# Patient Record
Sex: MALE | Race: WHITE | NOT HISPANIC OR LATINO | Employment: UNEMPLOYED | URBAN - METROPOLITAN AREA
[De-identification: names, ages, dates, MRNs, and addresses within clinical notes are randomized per-mention and may not be internally consistent; named-entity substitution may affect disease eponyms.]

---

## 2019-04-08 PROBLEM — E03.9 HYPOTHYROIDISM: Status: ACTIVE | Noted: 2019-04-08

## 2019-04-17 PROBLEM — J98.9 DISEASE OF AIRWAY: Status: ACTIVE | Noted: 2019-02-06

## 2019-10-30 PROBLEM — E07.9 THYROID DISEASE: Status: ACTIVE | Noted: 2019-04-08

## 2019-10-30 PROBLEM — E78.5 HYPERLIPIDEMIA: Status: ACTIVE | Noted: 2019-10-30

## 2021-05-12 ENCOUNTER — PATIENT MESSAGE (OUTPATIENT)
Dept: RESEARCH | Facility: HOSPITAL | Age: 46
End: 2021-05-12

## 2021-05-14 PROBLEM — G47.33 SEVERE OBSTRUCTIVE SLEEP APNEA: Status: ACTIVE | Noted: 2021-05-14

## 2021-05-19 ENCOUNTER — HISTORICAL (OUTPATIENT)
Dept: ADMINISTRATIVE | Facility: HOSPITAL | Age: 46
End: 2021-05-19

## 2021-12-17 ENCOUNTER — HISTORICAL (OUTPATIENT)
Dept: ADMINISTRATIVE | Facility: HOSPITAL | Age: 46
End: 2021-12-17

## 2023-01-07 ENCOUNTER — HISTORICAL (OUTPATIENT)
Dept: ADMINISTRATIVE | Facility: HOSPITAL | Age: 48
End: 2023-01-07

## 2023-01-26 DIAGNOSIS — R25.1 TREMOR: ICD-10-CM

## 2023-01-26 DIAGNOSIS — R41.3 MEMORY LOSS: Primary | ICD-10-CM

## 2023-01-26 DIAGNOSIS — R26.9 GAIT ABNORMALITY: ICD-10-CM

## 2023-08-30 ENCOUNTER — OFFICE VISIT (OUTPATIENT)
Dept: NEUROLOGY | Facility: CLINIC | Age: 48
End: 2023-08-30
Payer: COMMERCIAL

## 2023-08-30 VITALS
BODY MASS INDEX: 52.48 KG/M2 | WEIGHT: 315 LBS | SYSTOLIC BLOOD PRESSURE: 128 MMHG | HEIGHT: 65 IN | DIASTOLIC BLOOD PRESSURE: 64 MMHG

## 2023-08-30 DIAGNOSIS — R25.1 TREMOR: ICD-10-CM

## 2023-08-30 DIAGNOSIS — F32.A DEPRESSION, UNSPECIFIED DEPRESSION TYPE: ICD-10-CM

## 2023-08-30 DIAGNOSIS — R26.9 GAIT ABNORMALITY: ICD-10-CM

## 2023-08-30 DIAGNOSIS — R41.3 MEMORY LOSS: ICD-10-CM

## 2023-08-30 PROCEDURE — 99999 PR PBB SHADOW E&M-EST. PATIENT-LVL III: CPT | Mod: PBBFAC,,, | Performed by: SPECIALIST

## 2023-08-30 PROCEDURE — 99205 OFFICE O/P NEW HI 60 MIN: CPT | Mod: S$GLB,,, | Performed by: SPECIALIST

## 2023-08-30 PROCEDURE — 99205 PR OFFICE/OUTPT VISIT, NEW, LEVL V, 60-74 MIN: ICD-10-PCS | Mod: S$GLB,,, | Performed by: SPECIALIST

## 2023-08-30 PROCEDURE — 99999 PR PBB SHADOW E&M-EST. PATIENT-LVL III: ICD-10-PCS | Mod: PBBFAC,,, | Performed by: SPECIALIST

## 2023-08-30 RX ORDER — BUSPIRONE HYDROCHLORIDE 10 MG/1
10 TABLET ORAL DAILY
COMMUNITY
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
## 2023-09-22 ENCOUNTER — OFFICE VISIT (OUTPATIENT)
Dept: NEUROLOGY | Facility: CLINIC | Age: 48
End: 2023-09-22
Payer: COMMERCIAL

## 2023-09-22 DIAGNOSIS — F32.A DEPRESSION, UNSPECIFIED DEPRESSION TYPE: ICD-10-CM

## 2023-09-22 DIAGNOSIS — F09 MILD COGNITIVE DISORDER: Primary | ICD-10-CM

## 2023-09-22 PROCEDURE — 99213 PR OFFICE/OUTPT VISIT, EST, LEVL III, 20-29 MIN: ICD-10-PCS | Mod: 95,,, | Performed by: SPECIALIST

## 2023-09-22 PROCEDURE — 99213 OFFICE O/P EST LOW 20 MIN: CPT | Mod: 95,,, | Performed by: SPECIALIST

## 2023-09-22 NOTE — PROGRESS NOTES
This is a telemedicine note.   Patient was treated using telemedicine, real time audio and video, according to [facility] protocols.   I, Nick Arnold MD, conducted the visit from the Neurology clinic of [facility].   The patient participated in the visit at a non-[facility] location selected by the patient, identified below.   I am licensed in the state where the patient stated they are located.   The patient stated that they understood and accepted the privacy and security risks to their information at their location.   This visit is not recorded.    Patient was located at the patient's home.     Elijah Rivera is a 48 y.o. male seen today via telemedicine visit.       Subjective:         Patient ID: Elijah Rivera is a 48 y.o. male.    Chief Complaint: fu cog impairm    HPI:           No chief complaint on file.  Many years of cogn impairment worsening over time per wife     notes may also be on facesheet for HPI, ROS, and other sections     Review of Systems          Social History     Socioeconomic History    Marital status:    Tobacco Use    Smoking status: Former     Current packs/day: 0.00     Types: Cigarettes     Quit date: 10/11/1999     Years since quittin.9    Smokeless tobacco: Never   Substance and Sexual Activity    Alcohol use: Not Currently    Drug use: Never     __Lives alone  Lives with ___  __Drives   __Does not drive   __Working   Retired:     Current Outpatient Medications   Medication Instructions    ADVAIR DISKUS 500-50 mcg/dose DsDv diskus inhaler inhale one PUFF into THE lungs TWICE DAILY    albuterol (PROVENTIL/VENTOLIN HFA) 90 mcg/actuation inhaler inhale 2 puffs by mouth every 4 to 6 hours as needed    blood sugar diagnostic Strp 1 strip, Misc.(Non-Drug; Combo Route), 2 times daily    busPIRone (BUSPAR) 10 mg, Oral, Daily    lancets (ONETOUCH DELICA LANCETS) 33 gauge Misc 1 lancet , Misc.(Non-Drug; Combo Route), 2 times daily    levothyroxine (SYNTHROID) 200 MCG  tablet TAKE ONE TABLET BY MOUTH EVERY DAY BEFORE BREAKFAST    lisinopriL-hydrochlorothiazide (PRINZIDE,ZESTORETIC) 10-12.5 mg per tablet 1 tablet, Oral, Daily    metFORMIN (GLUCOPHAGE) 1000 MG tablet take 1 tablet by mouth 2 times daily with meals    montelukast (SINGULAIR) 10 mg, Oral    ONETOUCH DELICA PLUS LANCET 30 gauge Misc 2 times daily    primidone (MYSOLINE) 50 mg, Oral, 2 times daily    rosuvastatin (CRESTOR) 10 mg, Oral, Nightly    TRINTELLIX 20 mg, Oral, Daily        Objective:      Exam Limited due to telemedicine restrictions.  There were no vitals taken for this visit.    General:   if accompanied, by:_ wife in drivers seat in parked car and some unkn family member in back seat     Neurological  Speech: he let wife talk   EOMs:  coord:   Gait:   No no head tremor intermittently present     Neuroimaging:  Images and imaging reports reviewed.  My comments: head CT dec looked ok     Labs:    meds:          Assessment/Plan:       Problem List Items Addressed This Visit          Neuro    Mild cognitive disorder - Primary       Psychiatric    Depression       Other comments/ follow up:      Hopeful reassurance attempted that we were not dealing with a neurodegenerative process such as Alzheimer's disease.    Apologies that I could not give a specific label a diagnosis.    Apologies that I had no pharmacological or other treatment recommendations at present     Suggested that perhaps other offices might be of greater help to them  Recommended that he ask his primary care office if they have suggestions for him to seek attention elsewhere    Video Time Documentation:  Spent 8 minutes with patient over video discussing health concerns.     Total time this visit:    15  min    Nick Arnold MD [facility]

## 2023-11-29 ENCOUNTER — HOSPITAL ENCOUNTER (OUTPATIENT)
Dept: RADIOLOGY | Facility: HOSPITAL | Age: 48
Discharge: HOME | End: 2023-11-29
Attending: NURSE PRACTITIONER
Payer: COMMERCIAL

## 2023-11-29 PROCEDURE — 76536 US EXAM OF HEAD AND NECK: CPT | Mod: TC

## 2024-08-09 ENCOUNTER — HOSPITAL ENCOUNTER (OUTPATIENT)
Dept: RADIOLOGY | Facility: HOSPITAL | Age: 49
Discharge: HOME | End: 2024-08-09
Attending: NURSE PRACTITIONER
Payer: COMMERCIAL

## 2024-08-09 PROCEDURE — 73630 X-RAY EXAM OF FOOT: CPT | Mod: TC,LT

## 2024-10-01 ENCOUNTER — HOSPITAL ENCOUNTER (EMERGENCY)
Facility: HOSPITAL | Age: 49
Discharge: HOME | End: 2024-10-01
Attending: FAMILY MEDICINE
Payer: COMMERCIAL

## 2024-10-01 VITALS
SYSTOLIC BLOOD PRESSURE: 116 MMHG | WEIGHT: 315 LBS | RESPIRATION RATE: 20 BRPM | OXYGEN SATURATION: 99 % | TEMPERATURE: 98 F | HEART RATE: 91 BPM | BODY MASS INDEX: 52.48 KG/M2 | DIASTOLIC BLOOD PRESSURE: 75 MMHG | HEIGHT: 65 IN

## 2024-10-01 DIAGNOSIS — R07.9 CHEST PAIN: Primary | ICD-10-CM

## 2024-10-01 LAB
ALBUMIN SERPL-MCNC: 4.7 G/DL (ref 3.4–5)
ALBUMIN/GLOB SERPL: 1.6 RATIO
ALP SERPL-CCNC: 89 UNIT/L (ref 50–144)
ALT SERPL-CCNC: 44 UNIT/L (ref 1–45)
ANION GAP SERPL CALC-SCNC: 12 MEQ/L (ref 2–13)
AST SERPL-CCNC: 35 UNIT/L (ref 17–59)
BACTERIA #/AREA URNS AUTO: ABNORMAL /HPF
BASOPHILS # BLD AUTO: 0.04 X10(3)/MCL (ref 0.01–0.08)
BASOPHILS NFR BLD AUTO: 0.5 % (ref 0.1–1.2)
BILIRUB SERPL-MCNC: 0.8 MG/DL (ref 0–1)
BILIRUB UR QL STRIP.AUTO: NEGATIVE
BNP BLD-MCNC: <20 PG/ML (ref 0–124.9)
BUN SERPL-MCNC: 17 MG/DL (ref 7–20)
CALCIUM SERPL-MCNC: 10.1 MG/DL (ref 8.4–10.2)
CHLORIDE SERPL-SCNC: 101 MMOL/L (ref 98–110)
CK MB SERPL-MCNC: 0.84 NG/ML (ref 0–3.38)
CK SERPL-CCNC: 107 U/L (ref 55–170)
CLARITY UR: CLEAR
CO2 SERPL-SCNC: 29 MMOL/L (ref 21–32)
COLOR UR AUTO: YELLOW
CREAT SERPL-MCNC: 1.19 MG/DL (ref 0.66–1.25)
CREAT/UREA NIT SERPL: 14 (ref 12–20)
D DIMER PPP IA.FEU-MCNC: 0.24 MG/L (ref 0.19–0.5)
EOSINOPHIL # BLD AUTO: 0.14 X10(3)/MCL (ref 0.04–0.54)
EOSINOPHIL NFR BLD AUTO: 1.8 % (ref 0.7–7)
ERYTHROCYTE [DISTWIDTH] IN BLOOD BY AUTOMATED COUNT: 15.9 %
GFR SERPLBLD CREATININE-BSD FMLA CKD-EPI: 74 ML/MIN/1.73/M2
GLOBULIN SER-MCNC: 3 GM/DL (ref 2–3.9)
GLUCOSE SERPL-MCNC: 151 MG/DL (ref 70–115)
GLUCOSE UR QL STRIP: >=1000
HCT VFR BLD AUTO: 48.8 % (ref 36–52)
HGB BLD-MCNC: 16 G/DL (ref 13–18)
HGB UR QL STRIP: ABNORMAL
IMM GRANULOCYTES # BLD AUTO: 0.02 X10(3)/MCL (ref 0–0.03)
IMM GRANULOCYTES NFR BLD AUTO: 0.3 % (ref 0–0.5)
KETONES UR QL STRIP: NEGATIVE
LEUKOCYTE ESTERASE UR QL STRIP: NEGATIVE
LYMPHOCYTES # BLD AUTO: 1.54 X10(3)/MCL (ref 1.32–3.57)
LYMPHOCYTES NFR BLD AUTO: 19.4 % (ref 20–55)
MCH RBC QN AUTO: 25.3 PG (ref 27–34)
MCHC RBC AUTO-ENTMCNC: 32.8 G/DL (ref 31–37)
MCV RBC AUTO: 77.1 FL (ref 79–99)
MONOCYTES # BLD AUTO: 0.76 X10(3)/MCL (ref 0.3–0.82)
MONOCYTES NFR BLD AUTO: 9.6 % (ref 4.7–12.5)
MUCOUS THREADS URNS QL MICRO: ABNORMAL /LPF
NEUTROPHILS # BLD AUTO: 5.43 X10(3)/MCL (ref 1.78–5.38)
NEUTROPHILS NFR BLD AUTO: 68.4 % (ref 37–73)
NITRITE UR QL STRIP: NEGATIVE
NRBC BLD AUTO-RTO: 0 %
PH UR STRIP: 6 [PH]
PLATELET # BLD AUTO: 203 X10(3)/MCL (ref 140–371)
PMV BLD AUTO: 9.2 FL (ref 9.4–12.4)
POTASSIUM SERPL-SCNC: 3.5 MMOL/L (ref 3.5–5.1)
PROT SERPL-MCNC: 7.7 GM/DL (ref 6.3–8.2)
PROT UR QL STRIP: NEGATIVE
RBC # BLD AUTO: 6.33 X10(6)/MCL (ref 4–6)
RBC #/AREA URNS AUTO: ABNORMAL /HPF
SARS-COV-2 RDRP RESP QL NAA+PROBE: NEGATIVE
SODIUM SERPL-SCNC: 142 MMOL/L (ref 136–145)
SP GR UR STRIP.AUTO: 1.01 (ref 1–1.03)
SQUAMOUS #/AREA URNS AUTO: ABNORMAL /HPF
TROPONIN I SERPL-MCNC: <0.012 NG/ML (ref 0–0.03)
TROPONIN I SERPL-MCNC: <0.012 NG/ML (ref 0–0.03)
UROBILINOGEN UR STRIP-ACNC: 0.2
WBC # BLD AUTO: 7.93 X10(3)/MCL (ref 4–11.5)
WBC #/AREA URNS AUTO: ABNORMAL /HPF

## 2024-10-01 PROCEDURE — 82553 CREATINE MB FRACTION: CPT | Performed by: FAMILY MEDICINE

## 2024-10-01 PROCEDURE — 82550 ASSAY OF CK (CPK): CPT | Performed by: FAMILY MEDICINE

## 2024-10-01 PROCEDURE — 85379 FIBRIN DEGRADATION QUANT: CPT | Performed by: FAMILY MEDICINE

## 2024-10-01 PROCEDURE — U0002 COVID-19 LAB TEST NON-CDC: HCPCS | Performed by: FAMILY MEDICINE

## 2024-10-01 PROCEDURE — 83880 ASSAY OF NATRIURETIC PEPTIDE: CPT | Performed by: FAMILY MEDICINE

## 2024-10-01 PROCEDURE — 80053 COMPREHEN METABOLIC PANEL: CPT | Performed by: FAMILY MEDICINE

## 2024-10-01 PROCEDURE — 85025 COMPLETE CBC W/AUTO DIFF WBC: CPT | Performed by: FAMILY MEDICINE

## 2024-10-01 PROCEDURE — 93010 ELECTROCARDIOGRAM REPORT: CPT | Mod: ,,, | Performed by: INTERNAL MEDICINE

## 2024-10-01 PROCEDURE — 81003 URINALYSIS AUTO W/O SCOPE: CPT | Performed by: FAMILY MEDICINE

## 2024-10-01 PROCEDURE — 82962 GLUCOSE BLOOD TEST: CPT

## 2024-10-01 PROCEDURE — 81015 MICROSCOPIC EXAM OF URINE: CPT | Performed by: FAMILY MEDICINE

## 2024-10-01 PROCEDURE — 99285 EMERGENCY DEPT VISIT HI MDM: CPT | Mod: 25

## 2024-10-01 PROCEDURE — 93005 ELECTROCARDIOGRAM TRACING: CPT

## 2024-10-01 PROCEDURE — 84484 ASSAY OF TROPONIN QUANT: CPT | Performed by: FAMILY MEDICINE

## 2024-10-01 NOTE — ED PROVIDER NOTES
Encounter Date: 10/1/2024       History     Chief Complaint   Patient presents with    Chest Pain    Shortness of Breath     Pt reports he started having center sharp chest pain right before he came here will he was washing a car and also became sob. Pt is also c/o tingling in his left arm and is diaphoretic. Reports that pain has started to subside slightly.     Sudden onset sharp chest pain with shortness of breath and dizziness along with left hand tingling    The history is provided by the patient and a relative.   Chest Pain  The current episode started just prior to arrival. The chest pain is improving. The quality of the pain is described as sharp. Primary symptoms include shortness of breath.   Associated symptoms include diaphoresis (was working outside).   Shortness of Breath  Associated symptoms include chest pain.     Review of patient's allergies indicates:   Allergen Reactions    Fig Anaphylaxis    Kiwi (actinidia chinensis) Anaphylaxis    Venom-wasp      Past Medical History:   Diagnosis Date    Benign hypertension 2016    Hyperlipidemia     Severe obstructive sleep apnea 2021    Dx per Sleep Study done on 2021    Thyroid disease     Type 2 diabetes mellitus 2016     Past Surgical History:   Procedure Laterality Date    ROTATOR CUFF REPAIR       Family History   Problem Relation Name Age of Onset    Asthma Mother      COPD Mother      Heart disease Father      Hyperlipidemia Father      Heart attack Father      Lung cancer Maternal Uncle      Heart disease Paternal Aunt      Heart disease Paternal Uncle       Social History     Tobacco Use    Smoking status: Former     Current packs/day: 0.00     Types: Cigarettes     Quit date: 10/11/1999     Years since quittin.9    Smokeless tobacco: Never   Substance Use Topics    Alcohol use: Not Currently    Drug use: Never     Review of Systems   Constitutional:  Positive for diaphoresis (was working outside).   Respiratory:  Positive  for shortness of breath.    Cardiovascular:  Positive for chest pain.   All other systems reviewed and are negative.      Physical Exam     Initial Vitals [10/01/24 1252]   BP Pulse Resp Temp SpO2   (!) 134/90 105 (!) 22 98.7 °F (37.1 °C) 99 %      MAP       --         Physical Exam    Nursing note and vitals reviewed.  Constitutional: He appears well-developed and well-nourished. He is diaphoretic. No distress.   HENT:   Head: Normocephalic and atraumatic.   Nose: Nose normal. Mouth/Throat: Oropharynx is clear and moist.   Eyes: Conjunctivae and EOM are normal. Pupils are equal, round, and reactive to light.   Neck: Neck supple. No tracheal deviation present.   Normal range of motion.  Cardiovascular:  Normal rate, intact distal pulses and normal pulses.     Exam reveals no decreased pulses.       Pulmonary/Chest: Effort normal. No respiratory distress.   Normal rate   Abdominal: Abdomen is soft. He exhibits no distension. There is no abdominal tenderness.   Musculoskeletal:         General: No edema. Normal range of motion.      Cervical back: Normal range of motion and neck supple.      Comments: No Acute Change     Neurological: He is alert and oriented to person, place, and time. GCS score is 15. GCS eye subscore is 4. GCS verbal subscore is 5. GCS motor subscore is 6.   No acute change   Skin: Skin is warm. No rash noted.   Psychiatric: He has a normal mood and affect. Thought content normal.         ED Course   Procedures  Labs Reviewed   COMPREHENSIVE METABOLIC PANEL - Abnormal       Result Value    Sodium 142      Potassium 3.5      Chloride 101      CO2 29      Glucose 151 (*)     Blood Urea Nitrogen 17      Creatinine 1.19      Calcium 10.1      Protein Total 7.7      Albumin 4.7      Globulin 3.0      Albumin/Globulin Ratio 1.6      Bilirubin Total 0.8      ALP 89      ALT 44      AST 35      eGFR 74      Anion Gap 12.0      BUN/Creatinine Ratio 14     URINALYSIS - Abnormal    Color, UA Yellow       Appearance, UA Clear      Specific Gravity, UA 1.010      pH, UA 6.0      Protein, UA Negative      Glucose, UA >=1000 (*)     Ketones, UA Negative      Blood, UA Trace-Intact (*)     Bilirubin, UA Negative      Urobilinogen, UA 0.2      Nitrites, UA Negative      Leukocyte Esterase, UA Negative      Narrative:      URINE STABILITY IS 2 HOURS AT ROOM TEMP OR    SIX HOURS REFRIGERATED. PERFORMING TESTING ON    SPECIMENS GREATER THAN THIS AGE MAY AFFECT THE    FOLLOWING TESTS:    PH          SPECIFIC GRAVITY           BLOOD    CLARITY     BILIRUBIN               UROBILINOGEN   CBC WITH DIFFERENTIAL - Abnormal    WBC 7.93      RBC 6.33 (*)     Hgb 16.0      Hct 48.8      MCV 77.1 (*)     MCH 25.3 (*)     MCHC 32.8      RDW 15.9      Platelet 203      MPV 9.2 (*)     Neut % 68.4      Lymph % 19.4 (*)     Mono % 9.6      Eos % 1.8      Basophil % 0.5      Lymph # 1.54      Neut # 5.43 (*)     Mono # 0.76      Eos # 0.14      Baso # 0.04      IG# 0.02      IG% 0.3      NRBC% 0.0     URINALYSIS, MICROSCOPIC - Abnormal    Bacteria, UA None Seen      Mucous, UA Trace (*)     RBC, UA 0-2      WBC, UA 0-2      Squamous Epithelial Cells, UA Rare     TROPONIN I - Normal    Troponin-I <0.012     CK - Normal    Creatine Kinase 107     CK-MB - Normal    Creatine Kinase MB 0.84     NT-PRO NATRIURETIC PEPTIDE - Normal    ProBNP <20.0     SARS-COV-2 RNA AMPLIFICATION, QUAL - Normal    SARS COV-2 Molecular Negative      Narrative:     The IDNOW COVID-19 assay is a rapid molecular in vitro diagnostic test utilizing an isothermal nucleic acid amplification technology intended for the qualitative detection of nucleic acid from the SARS-CoV-2 viral RNA in direct nasal, nasopharyngeal or throat swabs from individuals who are suspected of COVID-19 by their healthcare provider.   D DIMER, QUANTITATIVE - Normal    D-Dimer 0.24     TROPONIN I - Normal    Troponin-I <0.012     CBC W/ AUTO DIFFERENTIAL    Narrative:     The following orders were  created for panel order CBC Auto Differential.  Procedure                               Abnormality         Status                     ---------                               -----------         ------                     CBC with Differential[[id number]]       Abnormal            Final result                 Please view results for these tests on the individual orders.     EKG Readings: (Independently Interpreted)   Initial Reading: No STEMI. Rhythm: Sinus Tachycardia. Heart Rate: 104. Conduction: Normal. ST Segments: Normal ST Segments. Clinical Impression: Sinus Tachycardia       Imaging Results              X-Ray Chest 1 View (Final result)  Result time 10/01/24 13:17:39      Final result by Get Rahman MD (10/01/24 13:17:39)                   Impression:      No acute cardiopulmonary process      Electronically signed by: Get Rahman MD  Date:    10/01/2024  Time:    13:17               Narrative:    EXAMINATION:  Chest one view    CLINICAL HISTORY:  Chest pain    COMPARISON:  01/07/2023    FINDINGS:  Cardiac silhouette is normal size.  Central vessels are within normal limits.  No confluent airspace disease.  No visible pneumothorax or pleural effusion.  No acute osseous abnormality                                    X-Rays:   Independently Interpreted Readings:   Chest X-Ray: No infiltrates.     Medications - No data to display  Medical Decision Making  Amount and/or Complexity of Data Reviewed  Labs: ordered. Decision-making details documented in ED Course.  Radiology: ordered and independent interpretation performed. Decision-making details documented in ED Course.  ECG/medicine tests: ordered and independent interpretation performed. Decision-making details documented in ED Course.      Additional MDM:   Differential Diagnosis:   Symptom: Chest pain. <> The follow diagnoses were considered and will be evaluated: Acute MI, Angina Pectoris, Chest Wall Pain, Costochondritis, Musculoskeletal Pain,  Pericarditis, Pleurisy, Pneumonia, Pneumothorax, Pulmonary Embolism and ST Elevation MI.                                     Clinical Impression:  Final diagnoses:  [R07.9] Chest pain (Primary)          ED Disposition Condition    Discharge Stable          ED Prescriptions    None       Follow-up Information       Follow up With Specialties Details Why Contact Info    Diamond Cisse, FNP-C Family Medicine Schedule an appointment as soon as possible for a visit   [address]  [facility] [id number]  [phone]               Elliott Feliz MD  10/01/24 0782

## 2024-10-02 LAB
OHS QRS DURATION: 84 MS
OHS QTC CALCULATION: 433 MS
POCT GLUCOSE: 152 MG/DL (ref 70–110)

## 2025-03-18 ENCOUNTER — HOSPITAL ENCOUNTER (OUTPATIENT)
Facility: HOSPITAL | Age: 50
Discharge: HOME | End: 2025-03-19
Attending: EMERGENCY MEDICINE | Admitting: FAMILY MEDICINE
Payer: COMMERCIAL

## 2025-03-18 DIAGNOSIS — R07.9 CHEST PAIN: Primary | ICD-10-CM

## 2025-03-18 LAB
ALBUMIN SERPL-MCNC: 4.3 G/DL (ref 3.4–5)
ALBUMIN/GLOB SERPL: 1.7 RATIO
ALP SERPL-CCNC: 111 UNIT/L (ref 50–144)
ALT SERPL-CCNC: 42 UNIT/L (ref 1–45)
ANION GAP SERPL CALC-SCNC: 6 MEQ/L (ref 2–13)
AST SERPL-CCNC: 31 UNIT/L (ref 17–59)
BASOPHILS # BLD AUTO: 0.03 X10(3)/MCL (ref 0.01–0.08)
BASOPHILS NFR BLD AUTO: 0.4 % (ref 0.1–1.2)
BILIRUB SERPL-MCNC: 0.4 MG/DL (ref 0–1)
BNP BLD-MCNC: 22.6 PG/ML (ref 0–124.9)
BUN SERPL-MCNC: 19 MG/DL (ref 7–20)
CALCIUM SERPL-MCNC: 9.3 MG/DL (ref 8.4–10.2)
CHLORIDE SERPL-SCNC: 101 MMOL/L (ref 98–110)
CHOLEST SERPL-MCNC: 144 MG/DL (ref 0–200)
CO2 SERPL-SCNC: 30 MMOL/L (ref 21–32)
CREAT SERPL-MCNC: 0.89 MG/DL (ref 0.66–1.25)
CREAT/UREA NIT SERPL: 21 (ref 12–20)
D DIMER PPP IA.FEU-MCNC: 0.27 MG/L FEU (ref 0.19–0.5)
EOSINOPHIL # BLD AUTO: 0.15 X10(3)/MCL (ref 0.04–0.54)
EOSINOPHIL NFR BLD AUTO: 2 % (ref 0.7–7)
ERYTHROCYTE [DISTWIDTH] IN BLOOD BY AUTOMATED COUNT: 15.3 %
GFR SERPLBLD CREATININE-BSD FMLA CKD-EPI: >90 ML/MIN/1.73/M2
GLOBULIN SER-MCNC: 2.6 GM/DL (ref 2–3.9)
GLUCOSE SERPL-MCNC: 185 MG/DL (ref 70–115)
HCT VFR BLD AUTO: 44 % (ref 36–52)
HDLC SERPL-MCNC: 46 MG/DL (ref 40–60)
HGB BLD-MCNC: 14 G/DL (ref 13–18)
IMM GRANULOCYTES # BLD AUTO: 0.05 X10(3)/MCL (ref 0–0.03)
IMM GRANULOCYTES NFR BLD AUTO: 0.7 % (ref 0–0.5)
LDLC SERPL DIRECT ASSAY-SCNC: 68.6 MG/DL (ref 30–100)
LYMPHOCYTES # BLD AUTO: 1.62 X10(3)/MCL (ref 1.32–3.57)
LYMPHOCYTES NFR BLD AUTO: 21.8 % (ref 20–55)
MCH RBC QN AUTO: 24.9 PG (ref 27–34)
MCHC RBC AUTO-ENTMCNC: 31.8 G/DL (ref 31–37)
MCV RBC AUTO: 78.3 FL (ref 79–99)
MONOCYTES # BLD AUTO: 0.57 X10(3)/MCL (ref 0.3–0.82)
MONOCYTES NFR BLD AUTO: 7.7 % (ref 4.7–12.5)
NEUTROPHILS # BLD AUTO: 5 X10(3)/MCL (ref 1.78–5.38)
NEUTROPHILS NFR BLD AUTO: 67.4 % (ref 37–73)
NRBC BLD AUTO-RTO: 0 %
PLATELET # BLD AUTO: 188 X10(3)/MCL (ref 140–371)
PMV BLD AUTO: 9.4 FL (ref 9.4–12.4)
POCT GLUCOSE: 157 MG/DL (ref 70–110)
POTASSIUM SERPL-SCNC: 3.9 MMOL/L (ref 3.5–5.1)
PROT SERPL-MCNC: 6.9 GM/DL (ref 6.3–8.2)
RBC # BLD AUTO: 5.62 X10(6)/MCL (ref 4–6)
SODIUM SERPL-SCNC: 137 MMOL/L (ref 136–145)
TRIGL SERPL-MCNC: 171 MG/DL (ref 30–200)
TROPONIN I SERPL-MCNC: <0.012 NG/ML (ref 0–0.03)
TROPONIN I SERPL-MCNC: <0.012 NG/ML (ref 0–0.03)
WBC # BLD AUTO: 7.42 X10(3)/MCL (ref 4–11.5)

## 2025-03-18 PROCEDURE — 25000003 PHARM REV CODE 250: Performed by: EMERGENCY MEDICINE

## 2025-03-18 PROCEDURE — 93010 ELECTROCARDIOGRAM REPORT: CPT | Mod: ,,, | Performed by: INTERNAL MEDICINE

## 2025-03-18 PROCEDURE — 25000003 PHARM REV CODE 250: Performed by: INTERNAL MEDICINE

## 2025-03-18 PROCEDURE — 25000003 PHARM REV CODE 250

## 2025-03-18 PROCEDURE — 85025 COMPLETE CBC W/AUTO DIFF WBC: CPT | Performed by: EMERGENCY MEDICINE

## 2025-03-18 PROCEDURE — G0378 HOSPITAL OBSERVATION PER HR: HCPCS

## 2025-03-18 PROCEDURE — 80053 COMPREHEN METABOLIC PANEL: CPT | Performed by: EMERGENCY MEDICINE

## 2025-03-18 PROCEDURE — 85379 FIBRIN DEGRADATION QUANT: CPT | Performed by: INTERNAL MEDICINE

## 2025-03-18 PROCEDURE — 94799 UNLISTED PULMONARY SVC/PX: CPT

## 2025-03-18 PROCEDURE — 99900035 HC TECH TIME PER 15 MIN (STAT)

## 2025-03-18 PROCEDURE — 84484 ASSAY OF TROPONIN QUANT: CPT | Mod: 91

## 2025-03-18 PROCEDURE — 93005 ELECTROCARDIOGRAM TRACING: CPT

## 2025-03-18 PROCEDURE — 80061 LIPID PANEL: CPT

## 2025-03-18 PROCEDURE — 99285 EMERGENCY DEPT VISIT HI MDM: CPT | Mod: 25

## 2025-03-18 PROCEDURE — 83880 ASSAY OF NATRIURETIC PEPTIDE: CPT | Performed by: EMERGENCY MEDICINE

## 2025-03-18 PROCEDURE — 36415 COLL VENOUS BLD VENIPUNCTURE: CPT | Performed by: INTERNAL MEDICINE

## 2025-03-18 PROCEDURE — 84484 ASSAY OF TROPONIN QUANT: CPT | Performed by: EMERGENCY MEDICINE

## 2025-03-18 RX ORDER — FUROSEMIDE 40 MG/1
40 TABLET ORAL DAILY
Status: DISCONTINUED | OUTPATIENT
Start: 2025-03-19 | End: 2025-03-19 | Stop reason: HOSPADM

## 2025-03-18 RX ORDER — METOPROLOL TARTRATE 25 MG/1
25 TABLET, FILM COATED ORAL 2 TIMES DAILY
Status: DISCONTINUED | OUTPATIENT
Start: 2025-03-18 | End: 2025-03-19 | Stop reason: HOSPADM

## 2025-03-18 RX ORDER — NAPROXEN SODIUM 220 MG/1
81 TABLET, FILM COATED ORAL DAILY
Status: DISCONTINUED | OUTPATIENT
Start: 2025-03-19 | End: 2025-03-19 | Stop reason: HOSPADM

## 2025-03-18 RX ORDER — PANTOPRAZOLE SODIUM 40 MG/1
40 TABLET, DELAYED RELEASE ORAL 2 TIMES DAILY
Status: DISCONTINUED | OUTPATIENT
Start: 2025-03-18 | End: 2025-03-19 | Stop reason: HOSPADM

## 2025-03-18 RX ORDER — FLUOXETINE HYDROCHLORIDE 20 MG/1
40 CAPSULE ORAL NIGHTLY
Status: DISCONTINUED | OUTPATIENT
Start: 2025-03-18 | End: 2025-03-18

## 2025-03-18 RX ORDER — IBUPROFEN 200 MG
24 TABLET ORAL
Status: DISCONTINUED | OUTPATIENT
Start: 2025-03-18 | End: 2025-03-19 | Stop reason: HOSPADM

## 2025-03-18 RX ORDER — ASPIRIN 325 MG
325 TABLET ORAL
Status: COMPLETED | OUTPATIENT
Start: 2025-03-18 | End: 2025-03-18

## 2025-03-18 RX ORDER — PROCHLORPERAZINE EDISYLATE 5 MG/ML
5 INJECTION INTRAMUSCULAR; INTRAVENOUS EVERY 6 HOURS PRN
Status: DISCONTINUED | OUTPATIENT
Start: 2025-03-18 | End: 2025-03-19 | Stop reason: HOSPADM

## 2025-03-18 RX ORDER — GLUCAGON 1 MG
1 KIT INJECTION
Status: DISCONTINUED | OUTPATIENT
Start: 2025-03-18 | End: 2025-03-19 | Stop reason: HOSPADM

## 2025-03-18 RX ORDER — FLUOXETINE HYDROCHLORIDE 20 MG/1
40 CAPSULE ORAL NIGHTLY
COMMUNITY

## 2025-03-18 RX ORDER — FLUOXETINE HYDROCHLORIDE 20 MG/1
20 CAPSULE ORAL DAILY
Status: DISCONTINUED | OUTPATIENT
Start: 2025-03-19 | End: 2025-03-19 | Stop reason: HOSPADM

## 2025-03-18 RX ORDER — ENOXAPARIN SODIUM 100 MG/ML
60 INJECTION SUBCUTANEOUS EVERY 12 HOURS
Status: DISCONTINUED | OUTPATIENT
Start: 2025-03-18 | End: 2025-03-18

## 2025-03-18 RX ORDER — PRIMIDONE 50 MG/1
50 TABLET ORAL DAILY
Status: DISCONTINUED | OUTPATIENT
Start: 2025-03-19 | End: 2025-03-19 | Stop reason: HOSPADM

## 2025-03-18 RX ORDER — IBUPROFEN 200 MG
16 TABLET ORAL
Status: DISCONTINUED | OUTPATIENT
Start: 2025-03-18 | End: 2025-03-19 | Stop reason: HOSPADM

## 2025-03-18 RX ORDER — HYDROXYZINE HYDROCHLORIDE 25 MG/1
25 TABLET, FILM COATED ORAL DAILY PRN
Status: DISCONTINUED | OUTPATIENT
Start: 2025-03-18 | End: 2025-03-19 | Stop reason: HOSPADM

## 2025-03-18 RX ORDER — ACETAMINOPHEN 325 MG/1
650 TABLET ORAL EVERY 4 HOURS PRN
Status: DISCONTINUED | OUTPATIENT
Start: 2025-03-18 | End: 2025-03-19 | Stop reason: HOSPADM

## 2025-03-18 RX ORDER — HYDRALAZINE HYDROCHLORIDE 20 MG/ML
10 INJECTION INTRAMUSCULAR; INTRAVENOUS EVERY 6 HOURS PRN
Status: DISCONTINUED | OUTPATIENT
Start: 2025-03-18 | End: 2025-03-19 | Stop reason: HOSPADM

## 2025-03-18 RX ORDER — OXYCODONE HYDROCHLORIDE 5 MG/1
5 TABLET ORAL EVERY 4 HOURS PRN
Refills: 0 | Status: DISCONTINUED | OUTPATIENT
Start: 2025-03-18 | End: 2025-03-19 | Stop reason: HOSPADM

## 2025-03-18 RX ORDER — ENOXAPARIN SODIUM 100 MG/ML
40 INJECTION SUBCUTANEOUS EVERY 24 HOURS
Status: DISCONTINUED | OUTPATIENT
Start: 2025-03-18 | End: 2025-03-18

## 2025-03-18 RX ORDER — LOSARTAN POTASSIUM 25 MG/1
25 TABLET ORAL DAILY
Status: DISCONTINUED | OUTPATIENT
Start: 2025-03-19 | End: 2025-03-19 | Stop reason: HOSPADM

## 2025-03-18 RX ORDER — LOSARTAN POTASSIUM 50 MG/1
50 TABLET ORAL DAILY
Status: DISCONTINUED | OUTPATIENT
Start: 2025-03-19 | End: 2025-03-18

## 2025-03-18 RX ORDER — DAPAGLIFLOZIN 5 MG/1
5 TABLET, FILM COATED ORAL DAILY
COMMUNITY

## 2025-03-18 RX ORDER — METFORMIN HYDROCHLORIDE EXTENDED-RELEASE TABLETS 1000 MG/1
1000 TABLET, FILM COATED, EXTENDED RELEASE ORAL
COMMUNITY

## 2025-03-18 RX ORDER — ENOXAPARIN SODIUM 100 MG/ML
40 INJECTION SUBCUTANEOUS EVERY 12 HOURS
Status: DISCONTINUED | OUTPATIENT
Start: 2025-03-19 | End: 2025-03-19 | Stop reason: HOSPADM

## 2025-03-18 RX ORDER — DOXAZOSIN 1 MG/1
1 TABLET ORAL NIGHTLY
Status: DISCONTINUED | OUTPATIENT
Start: 2025-03-18 | End: 2025-03-19 | Stop reason: HOSPADM

## 2025-03-18 RX ORDER — MONTELUKAST SODIUM 10 MG/1
10 TABLET ORAL NIGHTLY
Status: DISCONTINUED | OUTPATIENT
Start: 2025-03-18 | End: 2025-03-19 | Stop reason: HOSPADM

## 2025-03-18 RX ORDER — FLUOXETINE HYDROCHLORIDE 20 MG/1
40 CAPSULE ORAL NIGHTLY
Status: DISCONTINUED | OUTPATIENT
Start: 2025-03-18 | End: 2025-03-19 | Stop reason: HOSPADM

## 2025-03-18 RX ORDER — INSULIN ASPART 100 [IU]/ML
0-5 INJECTION, SOLUTION INTRAVENOUS; SUBCUTANEOUS
Status: DISCONTINUED | OUTPATIENT
Start: 2025-03-18 | End: 2025-03-19

## 2025-03-18 RX ORDER — FLUTICASONE FUROATE AND VILANTEROL 200; 25 UG/1; UG/1
1 POWDER RESPIRATORY (INHALATION) DAILY
Status: DISCONTINUED | OUTPATIENT
Start: 2025-03-19 | End: 2025-03-19 | Stop reason: HOSPADM

## 2025-03-18 RX ORDER — ONDANSETRON HYDROCHLORIDE 2 MG/ML
4 INJECTION, SOLUTION INTRAVENOUS EVERY 8 HOURS PRN
Status: DISCONTINUED | OUTPATIENT
Start: 2025-03-18 | End: 2025-03-19 | Stop reason: HOSPADM

## 2025-03-18 RX ORDER — FLUOXETINE HYDROCHLORIDE 20 MG/1
60 CAPSULE ORAL DAILY
Status: DISCONTINUED | OUTPATIENT
Start: 2025-03-19 | End: 2025-03-18

## 2025-03-18 RX ORDER — ALBUTEROL SULFATE 90 UG/1
2 INHALANT RESPIRATORY (INHALATION) EVERY 4 HOURS PRN
Status: DISCONTINUED | OUTPATIENT
Start: 2025-03-18 | End: 2025-03-19 | Stop reason: CLARIF

## 2025-03-18 RX ORDER — SODIUM CHLORIDE 0.9 % (FLUSH) 0.9 %
10 SYRINGE (ML) INJECTION
Status: DISCONTINUED | OUTPATIENT
Start: 2025-03-18 | End: 2025-03-19 | Stop reason: HOSPADM

## 2025-03-18 RX ORDER — LEVOTHYROXINE SODIUM 75 UG/1
150 TABLET ORAL
Status: DISCONTINUED | OUTPATIENT
Start: 2025-03-19 | End: 2025-03-19 | Stop reason: HOSPADM

## 2025-03-18 RX ORDER — ATORVASTATIN CALCIUM 40 MG/1
40 TABLET, FILM COATED ORAL DAILY
Status: DISCONTINUED | OUTPATIENT
Start: 2025-03-19 | End: 2025-03-19 | Stop reason: HOSPADM

## 2025-03-18 RX ADMIN — PANTOPRAZOLE SODIUM 40 MG: 40 TABLET, DELAYED RELEASE ORAL at 10:03

## 2025-03-18 RX ADMIN — MONTELUKAST 10 MG: 10 TABLET, FILM COATED ORAL at 10:03

## 2025-03-18 RX ADMIN — ASPIRIN 325 MG ORAL TABLET 325 MG: 325 PILL ORAL at 04:03

## 2025-03-18 RX ADMIN — NITROGLYCERIN 1 INCH: 20 OINTMENT TOPICAL at 04:03

## 2025-03-18 RX ADMIN — FLUOXETINE HYDROCHLORIDE 40 MG: 20 CAPSULE ORAL at 10:03

## 2025-03-18 NOTE — CONSULTS
[facility]  [facility]    Patient Name: Elijah Rivera  MRN: [id number]  Admission Date: 3/18/2025  Hospital Length of Stay: 0 days  Code Status: No Order   Attending Provider: Jose Rooney MD   Consulting Provider: Shai Lewis NP  Primary Care Physician: Diamond Cisse, FNP-C  Principal Problem:<principal problem not specified>    Patient information was obtained from patient, ER records, and primary team.     Consults  Subjective:     Chief Complaint:    Telecardiology Consult  [facility]  Dr Rooney  Chest discomfort  > 30 minutes including review of medical records, provider and patient interview    HPI:   50-year-old male unknown to our services.  He has a history of hypertension, dyslipidemia, diabetes, hypothyroidism and morbid obesity.  He has seen Dr. Painter in the past about 2 years ago had a workup which he says was negative.  He also saw pulmonary in [place] and they did a stress test on him which they have never got the results.  He has had some chest discomfort on and off for the last couple years.  Today he developed some more intense chest discomfort located on the left side of his chest with some associated shortness of breath that radiated down his left arm with some diaphoresis.  He was brought to the ER for evaluation.  Upon arrival an EKG was done which showed a normal sinus rhythm and incomplete right bundle branch block which is unchanged from previous EKG.  Lab work is unremarkable and showed initial troponin is negative.    He is still of some discomfort in the emergency room.  According to family members he has been more lethargic and sleepier than usual.    Past Medical History:   Diagnosis Date    Benign hypertension 6/29/2016    Hyperlipidemia     Severe obstructive sleep apnea 5/14/2021    Dx per Sleep Study done on 5/11/2021    Thyroid disease     Type 2 diabetes mellitus 6/29/2016       Past Surgical History:   Procedure Laterality  Date    ROTATOR CUFF REPAIR         Review of patient's allergies indicates:   Allergen Reactions    Fig Anaphylaxis    Kiwi (actinidia chinensis) Anaphylaxis    Venom-wasp        No current facility-administered medications on file prior to encounter.     Current Outpatient Medications on File Prior to Encounter   Medication Sig    ADVAIR DISKUS 500-50 mcg/dose DsDv diskus inhaler Inhale 1 puff into the lungs 2 (two) times daily.    albuterol (PROVENTIL/VENTOLIN HFA) 90 mcg/actuation inhaler inhale 2 puffs by mouth every 4 to 6 hours as needed    blood sugar diagnostic Strp 1 strip by Misc.(Non-Drug; Combo Route) route 2 (two) times a day.    clobetasoL (TEMOVATE) 0.05 % cream Apply topically 2 (two) times daily. (Patient not taking: Reported on 1/10/2025)    clotrimazole (LOTRIMIN) 1 % cream Apply topically 2 (two) times daily. (Patient not taking: Reported on 1/10/2025)    FLUoxetine 20 MG capsule Take 3 capsules (60 mg total) by mouth once daily.    furosemide (LASIX) 40 MG tablet Take 1 tablet (40 mg total) by mouth once daily.    hydrOXYzine HCL (ATARAX) 25 MG tablet Take 25 mg by mouth daily as needed for Itching or Anxiety.    lancets (ONETOUCH DELICA LANCETS) 33 gauge Misc 1 lancet by Misc.(Non-Drug; Combo Route) route 2 (two) times a day.    levothyroxine (SYNTHROID) 150 MCG tablet Take 1 tablet (150 mcg total) by mouth before breakfast.    montelukast (SINGULAIR) 10 mg tablet Take 1 tablet (10 mg total) by mouth every evening.    ONETOUCH DELICA PLUS LANCET 30 gauge Misc 2 (two) times a day.    pantoprazole (PROTONIX) 40 MG tablet Take 1 tablet (40 mg total) by mouth 2 (two) times daily.    prazosin (MINIPRESS) 1 MG Cap Take 3 capsules (3 mg total) by mouth every evening.    primidone (MYSOLINE) 50 MG Tab Take 1 tablet (50 mg total) by mouth 2 (two) times a day.    rosuvastatin (CRESTOR) 10 MG tablet take 1 tablet by mouth every evening    torsemide (DEMADEX) 20 MG Tab Take 40 mg by mouth. (Patient not  taking: Reported on 1/10/2025)     Family History       Problem Relation (Age of Onset)    Asthma Mother    COPD Mother    Heart attack Father    Heart disease Father, Paternal Aunt, Paternal Uncle    Hyperlipidemia Father    Lung cancer Maternal Uncle          Tobacco Use    Smoking status: Former     Current packs/day: 0.00     Types: Cigarettes     Quit date: 10/11/1999     Years since quittin.4    Smokeless tobacco: Never   Substance and Sexual Activity    Alcohol use: Not Currently    Drug use: Never    Sexual activity: Not on file     Review of Systems   Constitutional: Positive for malaise/fatigue.   Eyes: Negative.    Cardiovascular:  Positive for chest pain and dyspnea on exertion.   Respiratory:  Positive for shortness of breath and sleep disturbances due to breathing.    Endocrine: Negative.    Musculoskeletal:  Positive for back pain.   Neurological: Negative.    Psychiatric/Behavioral: Negative.       Objective:     Vital Signs (Most Recent):  Temp: 98.1 °F (36.7 °C) (25 1645)  Pulse: 92 (25 1831)  Resp: 20 (25 183)  BP: 116/83 (25 183)  SpO2: (!) 94 % (25) Vital Signs (24h Range):  Temp:  [98.1 °F (36.7 °C)-98.7 °F (37.1 °C)] 98.1 °F (36.7 °C)  Pulse:  [] 92  Resp:  [18-22] 20  SpO2:  [94 %-99 %] 94 %  BP: (116-154)/(77-91) 116/83     Weight: (!) 168.1 kg (370 lb 8 oz)  Body mass index is 63.6 kg/m².    SpO2: (!) 94 %       No intake or output data in the 24 hours ending 25 1832    Lines/Drains/Airways       Peripheral Intravenous Line  Duration                  Peripheral IV - Single Lumen 25 1558 20 G Left Antecubital <1 day                    Physical Exam  Constitutional:       Appearance: He is obese.   HENT:      Nose: Nose normal.   Eyes:      Extraocular Movements: Extraocular movements intact.   Cardiovascular:      Rate and Rhythm: Normal rate and regular rhythm.   Pulmonary:      Comments: Decreased bilat  Abdominal:      General:  Bowel sounds are normal.   Musculoskeletal:         General: Normal range of motion.   Skin:     General: Skin is warm and dry.   Neurological:      General: No focal deficit present.      Mental Status: He is alert and oriented to person, place, and time.   Psychiatric:         Mood and Affect: Mood normal.         Significant Labs:   Recent Lab Results         03/18/25  1548        Albumin/Globulin Ratio 1.7       Albumin 4.3              ALT 42       Anion Gap 6.0       AST 31       Baso # 0.03       Basophil % 0.4       BILIRUBIN TOTAL 0.4       BUN 19       BUN/CREAT RATIO 21       Calcium 9.3       Chloride 101       CO2 30       Creatinine 0.89       eGFR >90  Comment:                      EGFR INTERPRETATION    Beginning 8/15/22 we are reporting the eGFRcr calculation as recommended by the National Kid[employer]. The eGFRcr equation has similar overall performance characteristics to the older equation, but the values may differ by more than 10% particularly at higher values of eGFRcr and younger adult ages.    NKF stages of chronic kidney disease (CKD)  Stage 1: Kidney damage with normal or increased eGFR (>90 mL/min/1.73 m^2)  Stage 2: Mild reduction in GFR (60-89 mL/min/1.73 m^2)  Stage 3a: Moderate reduction in GFR (45-59 mL/min/1.73 m^2)  Stage 3b: Moderate reduction in GFR (30-44 mL/min/1.73 m^2)  Stage 4: Severe reduction in GFR (15-29 mL/min/1.73 m^2)  Stage 5: Kidney failure (GFR <15 mL/min/1.73 m^2)      Estimated GFR calculated using the CKD-EPI creatinine (2021) equation.       Eos # 0.15       Eos % 2.0       Globulin, Total 2.6       Glucose 185       Hematocrit 44.0       Hemoglobin 14.0       Immature Grans (Abs) 0.05       Immature Granulocytes 0.7       Lymph # 1.62       LYMPH % 21.8       MCH 24.9       MCHC 31.8       MCV 78.3       Mono # 0.57       Mono % 7.7       MPV 9.4       Neut # 5.00       Neut % 67.4       nRBC 0.0       Platelet Count 188       Potassium 3.9        "ProBNP 22.6  Comment:   For ambulatory patients presenting to outpatient facilities with clinical suspicion of HF not previously diagnosed and at least one sign, symptom or risk factor for HF, NT-proBNP II test results should be interpreted as indicated below:    <125(pg/mL):"Negative: Heart Failure Unlikely"    >=125(pg/mL):"Consider Heart Failure as well as other causes of NT-proBNP elevation"             PROTEIN TOTAL 6.9       RBC 5.62       RDW 15.3       Sodium 137       Troponin I <0.012       WBC 7.42               Significant Imaging:   EKG: NSR incomplete RBBB, No ERVIN or ischemic changes, when compared to previous EKG unchanged     CXR - OK   Assessment and Plan:     Impression:  Chest pain with typical features  - Initial trop is negative  - EKG no ERVIN or ischemia  HTN  DLP  - Crestor   DM  Hypothyroid  Obesity    Plan:  He has previously seen Dr. Painter in the past  Still having some discomfort in the ER and family is concerned that he is so lethargic and sleepy day.  Feel it is best to admit him and observe him overnight  Continue to cycle cardiac biomarkers  Telemetry monitoring  Agree with aspirin  If he has a significant troponin bump can add subcu Lovenox  Echocardiogram in the morning  Will also notify Dr. Painter    Thank you for the consultation should you have any further questions or concerns please do not hesitate to call    There are no hospital problems to display for this patient.      VTE Risk Mitigation (From admission, onward)      None            Thank you for your consult.     Shai Lewis NP  Cardiology   [facility]        "

## 2025-03-18 NOTE — PROGRESS NOTES
Pharmacist Renal Dose Adjustment Note    Elijah Rivera is a 50 y.o. male being treated with the medication enoxaparin..    Patient Data:    Vital Signs (Most Recent):  Temp: 98.1 °F (36.7 °C) (03/18/25 1645)  Pulse: 92 (03/18/25 1831)  Resp: 20 (03/18/25 1831)  BP: 116/83 (03/18/25 1831)  SpO2: (!) 94 % (03/18/25 1831) Vital Signs (72h Range):  Temp:  [98.1 °F (36.7 °C)-98.7 °F (37.1 °C)]   Pulse:  []   Resp:  [18-22]   BP: (116-154)/(77-91)   SpO2:  [94 %-99 %]      Recent Labs   Lab 03/18/25  1548   CREATININE 0.89     Serum creatinine: 0.89 mg/dL 03/18/25 1548  Estimated creatinine clearance: 142.8 mL/min    Enoxaparin 40 mg sc daily will be changed to enoxaparin 60 mg sc BID per pharmacy renal dosing protocol for CrCl > 30 mL/min and BMI > 50.    Pharmacist's Name: Ritu Hankins

## 2025-03-18 NOTE — ED PROVIDER NOTES
Encounter Date: 3/18/2025       History     Chief Complaint   Patient presents with    Chest Pain     Pt reports he started having centralized chest pain that was going down his left arm earlier today. Reports that the pain has eased off some but is still there. LUZ ELENA      This is a 50-year-old male with past medical history of diabetes, hypertension, hyperlipidemia, obesity, hypothyroidism, presents emergency department with chest pain.  He says started about an hour prior to ER arrival.  Says it is like a pressure in his chest anteriorly.  He said it was moderate in nature.  Now it is mild like 2/10.  Says that it did radiate to his left arm.  He had some nausea but no vomiting, said he did have some sweating.  He did not have any difficulty breathing.  He says he has had this type of pain in the past before.  He says his last stress test was about a year ago and he thinks everything was okay with it.  He has no known history of coronary artery disease.  He does not smoke.  Does have a family history of early heart disease in his father.        Review of patient's allergies indicates:   Allergen Reactions    Fig Anaphylaxis    Kiwi (actinidia chinensis) Anaphylaxis    Venom-wasp      Past Medical History:   Diagnosis Date    Benign hypertension 6/29/2016    Hyperlipidemia     Severe obstructive sleep apnea 5/14/2021    Dx per Sleep Study done on 5/11/2021    Thyroid disease     Type 2 diabetes mellitus 6/29/2016     Past Surgical History:   Procedure Laterality Date    ROTATOR CUFF REPAIR       Family History   Problem Relation Name Age of Onset    Asthma Mother      COPD Mother      Heart disease Father      Hyperlipidemia Father      Heart attack Father      Lung cancer Maternal Uncle      Heart disease Paternal Aunt      Heart disease Paternal Uncle       Social History[1]  Review of Systems   Constitutional:  Negative for chills and fever.   HENT:  Negative for sore throat.    Respiratory:  Negative for  shortness of breath.    Cardiovascular:  Positive for chest pain. Negative for palpitations.   Gastrointestinal:  Positive for nausea. Negative for diarrhea and vomiting.   Genitourinary:  Negative for dysuria.   Musculoskeletal:  Negative for back pain.   Skin:  Negative for rash.   Neurological:  Negative for weakness and headaches.   Hematological:  Does not bruise/bleed easily.   All other systems reviewed and are negative.      Physical Exam     Initial Vitals [03/18/25 1548]   BP Pulse Resp Temp SpO2   (!) 147/91 104 18 98.7 °F (37.1 °C) 97 %      MAP       --         Physical Exam    Nursing note and vitals reviewed.  Constitutional: He appears well-developed and well-nourished. He is not diaphoretic. He is Obese . No distress.   HENT:   Head: Normocephalic and atraumatic. Mouth/Throat: Oropharynx is clear and moist. No oropharyngeal exudate.   Eyes: Conjunctivae and EOM are normal. Pupils are equal, round, and reactive to light.   Neck: Neck supple. No tracheal deviation present.   Normal range of motion.  Cardiovascular:  Normal rate, regular rhythm, normal heart sounds and intact distal pulses.           No murmur heard.  Pulmonary/Chest: Breath sounds normal. No stridor. No respiratory distress. He has no wheezes. He has no rhonchi. He has no rales.   Abdominal: Abdomen is soft. Bowel sounds are normal. He exhibits no distension. There is no abdominal tenderness. There is no rebound and no guarding.   Musculoskeletal:         General: No tenderness or edema. Normal range of motion.      Cervical back: Normal range of motion and neck supple.     Neurological: He is alert and oriented to person, place, and time. He has normal strength. No cranial nerve deficit or sensory deficit.   Skin: Skin is warm and dry. Capillary refill takes less than 2 seconds. No rash noted. No erythema. No pallor.   Psychiatric: He has a normal mood and affect. His behavior is normal. Thought content normal.         ED Course    Procedures  Labs Reviewed   COMPREHENSIVE METABOLIC PANEL - Abnormal       Result Value    Sodium 137      Potassium 3.9      Chloride 101      CO2 30      Glucose 185 (*)     Blood Urea Nitrogen 19      Creatinine 0.89      Calcium 9.3      Protein Total 6.9      Albumin 4.3      Globulin 2.6      Albumin/Globulin Ratio 1.7      Bilirubin Total 0.4            ALT 42      AST 31      eGFR >90      Anion Gap 6.0      BUN/Creatinine Ratio 21 (*)    CBC WITH DIFFERENTIAL - Abnormal    WBC 7.42      RBC 5.62      Hgb 14.0      Hct 44.0      MCV 78.3 (*)     MCH 24.9 (*)     MCHC 31.8      RDW 15.3      Platelet 188      MPV 9.4      Neut % 67.4      Lymph % 21.8      Mono % 7.7      Eos % 2.0      Basophil % 0.4      Lymph # 1.62      Neut # 5.00      Mono # 0.57      Eos # 0.15      Baso # 0.03      Imm Gran # 0.05 (*)     Imm Grans % 0.7 (*)     NRBC% 0.0     TROPONIN I - Normal    Troponin-I <0.012     NT-PRO NATRIURETIC PEPTIDE - Normal    ProBNP 22.6     TROPONIN I - Normal    Troponin-I <0.012     CBC W/ AUTO DIFFERENTIAL    Narrative:     The following orders were created for panel order CBC auto differential.  Procedure                               Abnormality         Status                     ---------                               -----------         ------                     CBC with Differential[[id number]]       Abnormal            Final result                 Please view results for these tests on the individual orders.   LIPID PANEL        ECG Results              EKG 12-lead (Preliminary result)  Result time 03/18/25 18:07:22      Wet Read by Jose Rooney MD (03/18/25 18:07:22, [facility])    Sinus rhythm, heart rate 99, normal intervals, normal axis, normal P waves, normal QRS, normal ST segments, normal T-waves, normal QT.                                  Imaging Results              X-Ray Chest AP Portable (Final result)  Result time  03/18/25 16:25:34      Final result by Gustavo Lanza MD (03/18/25 16:25:34)                   Impression:      Magnification as described above.      Electronically signed by: Gustavo Lanza  Date:    03/18/2025  Time:    16:25               Narrative:    EXAMINATION:  XR CHEST AP PORTABLE    CLINICAL HISTORY:  Chest Pain;    TECHNIQUE:  Single frontal view of the chest was performed.    COMPARISON:  10/01/2024    FINDINGS:  There is magnification of the cardiac silhouette and the perihilar bronchovesicular markings, making it difficult to exclude the possibility of a mild bronchial inflammatory process.    An epicardial fat pad is present at the left cardiac margin.    The lungs are otherwise clear, with no pleural effusion or pneumothorax.    Bones are intact.                                       Medications   sodium chloride 0.9% flush 10 mL (has no administration in time range)   acetaminophen tablet 650 mg (has no administration in time range)   oxyCODONE immediate release tablet 5 mg (has no administration in time range)   ondansetron injection 4 mg (has no administration in time range)   prochlorperazine injection Soln 5 mg (has no administration in time range)   enoxaparin injection 60 mg (has no administration in time range)   aspirin tablet 325 mg (325 mg Oral Given 3/18/25 1601)   nitroGLYCERIN 2% TD oint ointment 1 inch (1 inch Topical (Top) Given 3/18/25 1602)     Medical Decision Making  Differential includes but not limited to chest wall pain, acute coronary syndrome, PE, pericarditis, myocarditis, pericardial tamponade, aortic dissection, pneumonia, pneumothorax, Boerhaave syndrome, anemia, electrolyte abnormalities, herpes zoster, pancreatitis, anxiety, chest wall strain, costochondritis        Amount and/or Complexity of Data Reviewed  External Data Reviewed: labs and notes.  Labs: ordered. Decision-making details documented in ED Course.  Radiology: ordered and independent interpretation  performed. Decision-making details documented in ED Course.  ECG/medicine tests: ordered and independent interpretation performed. Decision-making details documented in ED Course.  Discussion of management or test interpretation with external provider(s): Tele cardiology recommends keeping the patient overnight, in observation.    Patient and findings discussed with Dr. Pradhan.  Admission orders and med rec completed.    Risk  OTC drugs.  Prescription drug management.  Decision regarding hospitalization.               ED Course as of 03/18/25 1926   Tue Mar 18, 2025   1552 EKG 3:48 p.m. normal sinus rhythm rate of 99.  Incomplete right bundle branch block.  No ST elevation or depression.  No STEMI.  EKG interpreted independently by me. [JR]      ED Course User Index  [JR] Tho Tsai DO                           Clinical Impression:  Final diagnoses:  [R07.9] Chest pain (Primary)          ED Disposition Condition    Observation Stable                    [1]   Social History  Tobacco Use    Smoking status: Former     Current packs/day: 0.00     Types: Cigarettes     Quit date: 10/11/1999     Years since quittin.4    Smokeless tobacco: Never   Substance Use Topics    Alcohol use: Not Currently    Drug use: Never        Jose Rooney MD  25

## 2025-03-19 VITALS
OXYGEN SATURATION: 96 % | WEIGHT: 315 LBS | HEIGHT: 64 IN | BODY MASS INDEX: 53.78 KG/M2 | DIASTOLIC BLOOD PRESSURE: 85 MMHG | HEART RATE: 74 BPM | RESPIRATION RATE: 18 BRPM | TEMPERATURE: 98 F | SYSTOLIC BLOOD PRESSURE: 132 MMHG

## 2025-03-19 LAB
ANION GAP SERPL CALC-SCNC: 3 MEQ/L (ref 2–13)
AORTIC VALVE CUSP SEPERATION: 1.94 CM
ASCENDING AORTA: 2.5 CM
AV INDEX (PROSTH): 0.92
AV MEAN GRADIENT: 4 MMHG
AV PEAK GRADIENT: 8 MMHG
AV VALVE AREA BY VELOCITY RATIO: 3 CM²
AV VALVE AREA: 3.2 CM²
AV VELOCITY RATIO: 0.86
BSA FOR ECHO PROCEDURE: 2.76 M2
BUN SERPL-MCNC: 17 MG/DL (ref 7–20)
CALCIUM SERPL-MCNC: 9.2 MG/DL (ref 8.4–10.2)
CHLORIDE SERPL-SCNC: 102 MMOL/L (ref 98–110)
CHOLEST SERPL-MCNC: 138 MG/DL (ref 0–200)
CO2 SERPL-SCNC: 31 MMOL/L (ref 21–32)
CREAT SERPL-MCNC: 0.82 MG/DL (ref 0.66–1.25)
CREAT/UREA NIT SERPL: 21 (ref 12–20)
CV ECHO LV RWT: 0.67 CM
DOP CALC AO PEAK VEL: 1.4 M/S
DOP CALC AO VTI: 26.9 CM
DOP CALC LVOT AREA: 3.5 CM2
DOP CALC LVOT DIAMETER: 2.1 CM
DOP CALC LVOT PEAK VEL: 1.2 M/S
DOP CALC LVOT STROKE VOLUME: 85.5 CM3
DOP CALCLVOT PEAK VEL VTI: 24.7 CM
E WAVE DECELERATION TIME: 120 MSEC
ECHO LV POSTERIOR WALL: 1.3 CM (ref 0.6–1.1)
EST. AVERAGE GLUCOSE BLD GHB EST-MCNC: 151.3 MG/DL (ref 70–115)
FRACTIONAL SHORTENING: 25.6 % (ref 28–44)
GFR SERPLBLD CREATININE-BSD FMLA CKD-EPI: >90 ML/MIN/1.73/M2
GLUCOSE SERPL-MCNC: 128 MG/DL (ref 70–115)
HBA1C MFR BLD: 6.9 % (ref 4–6)
HDLC SERPL-MCNC: 45 MG/DL (ref 40–60)
INFERIOR VENA CAVA SIZE SNIFF: 0.8 CM
INTERVENTRICULAR SEPTUM: 1 CM (ref 0.6–1.1)
IVC DIAMETER: 1.7 CM
LDLC SERPL DIRECT ASSAY-SCNC: 62.6 MG/DL (ref 30–100)
LEFT ATRIUM SIZE: 3.2 CM
LEFT ATRIUM VOLUME INDEX MOD: 24 ML/M2
LEFT ATRIUM VOLUME MOD: 62 ML
LEFT INTERNAL DIMENSION IN SYSTOLE: 2.9 CM (ref 2.1–4)
LEFT VENTRICLE DIASTOLIC VOLUME INDEX: 26.67 ML/M2
LEFT VENTRICLE DIASTOLIC VOLUME: 68 ML
LEFT VENTRICLE END SYSTOLIC VOLUME APICAL 2 CHAMBER: 73.6 ML
LEFT VENTRICLE END SYSTOLIC VOLUME APICAL 4 CHAMBER: 52.2 ML
LEFT VENTRICLE MASS INDEX: 58.6 G/M2
LEFT VENTRICLE SYSTOLIC VOLUME INDEX: 12.2 ML/M2
LEFT VENTRICLE SYSTOLIC VOLUME: 31 ML
LEFT VENTRICULAR INTERNAL DIMENSION IN DIASTOLE: 3.9 CM (ref 3.5–6)
LEFT VENTRICULAR MASS: 149.5 G
LVED V (TEICH): 67.5 ML
LVES V (TEICH): 31.4 ML
LVOT MG: 2.9 MMHG
LVOT MV: 0.78 CM/S
MV PEAK A VEL: 0.79 M/S
OHS QRS DURATION: 92 MS
OHS QRS DURATION: 96 MS
OHS QTC CALCULATION: 416 MS
OHS QTC CALCULATION: 449 MS
PISA TR MAX VEL: 2.1 M/S
POCT GLUCOSE: 115 MG/DL (ref 70–110)
POCT GLUCOSE: 154 MG/DL (ref 70–110)
POTASSIUM SERPL-SCNC: 3.8 MMOL/L (ref 3.5–5.1)
RA PRESSURE ESTIMATED: 8 MMHG
RV TB RVSP: 10 MMHG
SODIUM SERPL-SCNC: 136 MMOL/L (ref 136–145)
TDI LATERAL: 0.12 M/S
TDI SEPTAL: 0.12 M/S
TDI: 0.12 M/S
TR MAX PG: 17 MMHG
TRICUSPID ANNULAR PLANE SYSTOLIC EXCURSION: 1.86 CM
TRIGL SERPL-MCNC: 159 MG/DL (ref 30–200)
TROPONIN I SERPL-MCNC: <0.012 NG/ML (ref 0–0.03)
TROPONIN I SERPL-MCNC: <0.012 NG/ML (ref 0–0.03)
TV REST PULMONARY ARTERY PRESSURE: 26 MMHG
Z-SCORE OF LEFT VENTRICULAR DIMENSION IN END DIASTOLE: -14.09
Z-SCORE OF LEFT VENTRICULAR DIMENSION IN END SYSTOLE: -9.31

## 2025-03-19 PROCEDURE — 93010 ELECTROCARDIOGRAM REPORT: CPT | Mod: ,,, | Performed by: INTERNAL MEDICINE

## 2025-03-19 PROCEDURE — 27000190 HC CPAP FULL FACE MASK W/VALVE

## 2025-03-19 PROCEDURE — 25000003 PHARM REV CODE 250

## 2025-03-19 PROCEDURE — 93005 ELECTROCARDIOGRAM TRACING: CPT

## 2025-03-19 PROCEDURE — 25000003 PHARM REV CODE 250: Performed by: INTERNAL MEDICINE

## 2025-03-19 PROCEDURE — 96372 THER/PROPH/DIAG INJ SC/IM: CPT | Performed by: INTERNAL MEDICINE

## 2025-03-19 PROCEDURE — 94761 N-INVAS EAR/PLS OXIMETRY MLT: CPT

## 2025-03-19 PROCEDURE — 63600175 PHARM REV CODE 636 W HCPCS: Performed by: INTERNAL MEDICINE

## 2025-03-19 PROCEDURE — 27100171 HC OXYGEN HIGH FLOW UP TO 24 HOURS

## 2025-03-19 PROCEDURE — 80048 BASIC METABOLIC PNL TOTAL CA: CPT | Performed by: INTERNAL MEDICINE

## 2025-03-19 PROCEDURE — 99900035 HC TECH TIME PER 15 MIN (STAT)

## 2025-03-19 PROCEDURE — 36415 COLL VENOUS BLD VENIPUNCTURE: CPT | Performed by: INTERNAL MEDICINE

## 2025-03-19 PROCEDURE — 94660 CPAP INITIATION&MGMT: CPT

## 2025-03-19 PROCEDURE — 84484 ASSAY OF TROPONIN QUANT: CPT | Mod: 91

## 2025-03-19 PROCEDURE — 94640 AIRWAY INHALATION TREATMENT: CPT

## 2025-03-19 PROCEDURE — 80061 LIPID PANEL: CPT | Performed by: INTERNAL MEDICINE

## 2025-03-19 PROCEDURE — 25000242 PHARM REV CODE 250 ALT 637 W/ HCPCS

## 2025-03-19 PROCEDURE — G0378 HOSPITAL OBSERVATION PER HR: HCPCS

## 2025-03-19 PROCEDURE — 83036 HEMOGLOBIN GLYCOSYLATED A1C: CPT | Performed by: INTERNAL MEDICINE

## 2025-03-19 PROCEDURE — 94799 UNLISTED PULMONARY SVC/PX: CPT

## 2025-03-19 PROCEDURE — 36415 COLL VENOUS BLD VENIPUNCTURE: CPT | Mod: 59

## 2025-03-19 RX ORDER — ALBUTEROL SULFATE 0.83 MG/ML
2.5 SOLUTION RESPIRATORY (INHALATION) EVERY 4 HOURS PRN
Status: DISCONTINUED | OUTPATIENT
Start: 2025-03-19 | End: 2025-03-19 | Stop reason: HOSPADM

## 2025-03-19 RX ORDER — METOPROLOL TARTRATE 25 MG/1
25 TABLET, FILM COATED ORAL 2 TIMES DAILY
Start: 2025-03-19 | End: 2026-03-19

## 2025-03-19 RX ORDER — INSULIN ASPART 100 [IU]/ML
0-5 INJECTION, SOLUTION INTRAVENOUS; SUBCUTANEOUS
Status: DISCONTINUED | OUTPATIENT
Start: 2025-03-19 | End: 2025-03-19 | Stop reason: HOSPADM

## 2025-03-19 RX ORDER — NITROGLYCERIN 0.4 MG/1
0.4 TABLET SUBLINGUAL EVERY 5 MIN PRN
Qty: 30 TABLET | Refills: 0 | Status: SHIPPED | OUTPATIENT
Start: 2025-03-19 | End: 2026-03-19

## 2025-03-19 RX ORDER — LOSARTAN POTASSIUM 25 MG/1
25 TABLET ORAL DAILY
Start: 2025-03-20 | End: 2026-03-20

## 2025-03-19 RX ADMIN — FLUTICASONE FUROATE AND VILANTEROL TRIFENATATE 1 PUFF: 200; 25 POWDER RESPIRATORY (INHALATION) at 07:03

## 2025-03-19 RX ADMIN — LOSARTAN POTASSIUM 25 MG: 25 TABLET, FILM COATED ORAL at 09:03

## 2025-03-19 RX ADMIN — PRIMIDONE 50 MG: 50 TABLET ORAL at 09:03

## 2025-03-19 RX ADMIN — LEVOTHYROXINE SODIUM 150 MCG: 0.07 TABLET ORAL at 06:03

## 2025-03-19 RX ADMIN — FUROSEMIDE 40 MG: 40 TABLET ORAL at 09:03

## 2025-03-19 RX ADMIN — ATORVASTATIN CALCIUM 40 MG: 40 TABLET, FILM COATED ORAL at 09:03

## 2025-03-19 RX ADMIN — METOPROLOL TARTRATE 25 MG: 25 TABLET, FILM COATED ORAL at 09:03

## 2025-03-19 RX ADMIN — ASPIRIN 81 MG: 81 TABLET, CHEWABLE ORAL at 09:03

## 2025-03-19 RX ADMIN — FLUOXETINE HYDROCHLORIDE 20 MG: 20 CAPSULE ORAL at 09:03

## 2025-03-19 RX ADMIN — ENOXAPARIN SODIUM 40 MG: 40 INJECTION SUBCUTANEOUS at 09:03

## 2025-03-19 RX ADMIN — PANTOPRAZOLE SODIUM 40 MG: 40 TABLET, DELAYED RELEASE ORAL at 09:03

## 2025-03-19 NOTE — ED NOTES
Elijah Rivera  : 1975  MRN: [id number]  ConnectID: [id number]  REASON:  RAPID RESPONSE  ACUITY:  Immediate  SUBMITTED:  2025 19:02 CDT

## 2025-03-19 NOTE — CONSULTS
Consult from Cardiology  [facility]  Cardiology        This consultation is from Justin Painter and was requested by  Dr rGeen    Consults  Subjective:   50-year-old gentleman, personal history of hypertension, diabetes, hypothyroid, morbid obesity-BMI 64, presented him of emergency room for evaluation chest pain.  Chest pains nonexertional, left chest wall, moderate severity, intermittent, seconds to minutes at time up to 15-20 minutes, localized, sharp sensation.  No associated with shortness of breath, nausea, vomiting, palpitation, diaphoresis, syncope.  EKG sinus rhythm incomplete right bundle-branch block.  Troponins negative x2.  Pro BNP level was normal.  Echo LV function is normal.  Cardiology consulted for patient's chest pain.    Chest pain-troponin negative x2  Normal LV function per ECHO  Hypertension   Diabetes mellitus  Hypothyroid   Obstructive sleep apnea   Psoriasis  Morbid obesity BMI 64        Subjective:     Patient information was obtained from patient , medical record.    Past Medical History:   Past Medical History:   Diagnosis Date    Anxiety disorder, unspecified     Benign hypertension 2016    Depression     Hyperlipidemia     Psoriasis     Severe obstructive sleep apnea 2021    Dx per Sleep Study done on 2021    Thyroid disease     Type 2 diabetes mellitus 2016       Past Surgical History:   Past Surgical History:   Procedure Laterality Date    ROTATOR CUFF REPAIR         Family History:   Family History   Problem Relation Name Age of Onset    Asthma Mother      COPD Mother      Heart disease Father      Hyperlipidemia Father      Heart attack Father      Lung cancer Maternal Uncle      Heart disease Paternal Aunt      Heart disease Paternal Uncle          Social History:   Social History     Tobacco Use    Smoking status: Former     Current packs/day: 0.00     Types: Cigarettes     Quit date: 10/11/1999     Years since quittin.4    Smokeless  tobacco: Never   Substance Use Topics    Alcohol use: Not Currently        Medications: [Current Medications]    [Current Medications]    Current Facility-Administered Medications:     acetaminophen tablet 650 mg, 650 mg, Oral, Q4H PRN, Jose Rooney MD    albuterol nebulizer solution 2.5 mg, 2.5 mg, Nebulization, Q4H PRN, Padma Rodarte MD    aspirin chewable tablet 81 mg, 81 mg, Oral, Daily, Harleen Benito MD, 81 mg at 03/19/25 0906    atorvastatin tablet 40 mg, 40 mg, Oral, Daily, Jose Rooney MD, 40 mg at 03/19/25 0906    dextrose 50% injection 12.5 g, 12.5 g, Intravenous, PRN, Harleen Benito MD    dextrose 50% injection 25 g, 25 g, Intravenous, PRN, Harleen Benito MD    doxazosin tablet 1 mg, 1 mg, Oral, QHS, Jose Rooney MD    enoxaparin injection 40 mg, 40 mg, Subcutaneous, Q12H (prophylaxis, 0900/2100), Harleen Benito MD, 40 mg at 03/19/25 0906    FLUoxetine capsule 20 mg, 20 mg, Oral, Daily, Harleen Benito MD, 20 mg at 03/19/25 0906    FLUoxetine capsule 40 mg, 40 mg, Oral, QHS, Harleen Benito MD, 40 mg at 03/18/25 2235    fluticasone furoate-vilanteroL 200-25 mcg/dose diskus inhaler 1 puff, 1 puff, Inhalation, Daily, Jose Rooney MD, 1 puff at 03/19/25 0725    furosemide tablet 40 mg, 40 mg, Oral, Daily, Jose Rooney MD, 40 mg at 03/19/25 0906    glucagon (human recombinant) injection 1 mg, 1 mg, Intramuscular, PRN, Harleen Benito MD    glucose chewable tablet 16 g, 16 g, Oral, PRNThong Olayiwola, MD    glucose chewable tablet 24 g, 24 g, Oral, PRNThong Olayiwola, MD    hydrALAZINE injection 10 mg, 10 mg, Intravenous, Q6H PRN, Harleen Benito MD    hydrOXYzine HCL tablet 25 mg, 25 mg, Oral, Daily PRN, Jose Rooney MD    insulin aspart U-100 pen 0-5 Units, 0-5 Units, Subcutaneous, QID (AC + HS) PRN, Padma Rodarte MD    levothyroxine tablet 150 mcg, 150 mcg, Oral, Before breakfast, Jose Rooney MD, 150 mcg at 03/19/25  0650    losartan tablet 25 mg, 25 mg, Oral, Daily, Harleen Benito MD, 25 mg at 03/19/25 0906    metoprolol tartrate (LOPRESSOR) tablet 25 mg, 25 mg, Oral, BID, Harleen Benito MD, 25 mg at 03/19/25 0906    montelukast tablet 10 mg, 10 mg, Oral, QHS, Jose Rooney MD, 10 mg at 03/18/25 2236    ondansetron injection 4 mg, 4 mg, Intravenous, Q8H PRN, Jose Rooney MD    oxyCODONE immediate release tablet 5 mg, 5 mg, Oral, Q4H PRN, Jose Rooney MD    pantoprazole EC tablet 40 mg, 40 mg, Oral, BID, Jose Rooney MD, 40 mg at 03/19/25 0906    perflutren lipid microspheres injection 2 mL, 2 mL, Intravenous, Once, Padma Rodarte MD    primidone tablet 50 mg, 50 mg, Oral, Daily, Jose Rooney MD, 50 mg at 03/19/25 0906    prochlorperazine injection Soln 5 mg, 5 mg, Intravenous, Q6H PRN, Jose Rooney MD    sodium chloride 0.9% flush 10 mL, 10 mL, Intravenous, PRN, Jose Rooney MD    Current Outpatient Medications:     albuterol (PROVENTIL/VENTOLIN HFA) 90 mcg/actuation inhaler, inhale 2 puffs by mouth every 4 to 6 hours as needed, Disp: 8.5 g, Rfl: 11    dapagliflozin propanediol (FARXIGA) 5 mg Tab tablet, Take 5 mg by mouth once daily., Disp: , Rfl:     FLUoxetine 20 MG capsule, Take 40 mg by mouth every evening., Disp: , Rfl:     furosemide (LASIX) 40 MG tablet, Take 1 tablet (40 mg total) by mouth once daily., Disp: 30 tablet, Rfl: 2    levothyroxine (SYNTHROID) 150 MCG tablet, Take 1 tablet (150 mcg total) by mouth before breakfast., Disp: 30 tablet, Rfl: 1    metFORMIN (FORTAMET) 1,000 mg 24hr tablet, Take 1,000 mg by mouth daily with breakfast., Disp: , Rfl:     montelukast (SINGULAIR) 10 mg tablet, Take 1 tablet (10 mg total) by mouth every evening., Disp: 30 tablet, Rfl: 11    pantoprazole (PROTONIX) 40 MG tablet, Take 1 tablet (40 mg total) by mouth 2 (two) times daily., Disp: 60 tablet, Rfl: 5    prazosin (MINIPRESS) 1 MG Cap, Take 3 capsules (3 mg total) by mouth  every evening. (Patient taking differently: Take 1 mg by mouth every evening.), Disp: 90 capsule, Rfl: 1    rosuvastatin (CRESTOR) 10 MG tablet, take 1 tablet by mouth every evening, Disp: 90 tablet, Rfl: 3    blood sugar diagnostic Strp, 1 strip by Misc.(Non-Drug; Combo Route) route 2 (two) times a day., Disp: 100 strip, Rfl: 2    lancets (ONETOUCH DELICA LANCETS) 33 gauge Misc, 1 lancet by Misc.(Non-Drug; Combo Route) route 2 (two) times a day., Disp: 100 each, Rfl: 2    [START ON 3/20/2025] losartan (COZAAR) 25 MG tablet, Take 1 tablet (25 mg total) by mouth once daily., Disp: , Rfl:     metoprolol tartrate (LOPRESSOR) 25 MG tablet, Take 1 tablet (25 mg total) by mouth 2 (two) times daily., Disp: , Rfl:     nitroGLYCERIN (NITROSTAT) 0.4 MG SL tablet, Place 1 tablet (0.4 mg total) under the tongue every 5 (five) minutes as needed for Chest pain., Disp: 30 tablet, Rfl: 0    ONETOUCH DELICA PLUS LANCET 30 gauge Misc, 2 (two) times a day., Disp: , Rfl:       Allergies:   Review of patient's allergies indicates:   Allergen Reactions    Fig Anaphylaxis    Kiwi (actinidia chinensis) Anaphylaxis    Venom-wasp        Review of Systems   Constitutional: Negative.   HENT: Negative.     Eyes: Negative.    Cardiovascular:  Positive for chest pain. Negative for dyspnea on exertion, orthopnea, palpitations, paroxysmal nocturnal dyspnea and syncope.   Respiratory:  Negative for cough and shortness of breath.    Endocrine: Negative.    Hematologic/Lymphatic: Negative.    Skin:  Positive for dry skin.   Musculoskeletal:  Positive for arthritis.   Gastrointestinal: Negative.    Genitourinary: Negative.    Neurological: Negative.    Psychiatric/Behavioral:  The patient has insomnia.    Allergic/Immunologic: Negative.      Objective:     Vitals:   Vitals:    03/19/25 1100   BP: 132/85   Pulse: 74   Resp: 18   Temp: 98.3 °F (36.8 °C)        Physical Exam  Constitutional:       General: He is not in acute distress.  HENT:      Head:  Normocephalic and atraumatic.      Nose: No congestion.      Mouth/Throat:      Mouth: Mucous membranes are moist.   Eyes:      Extraocular Movements: Extraocular movements intact.      Pupils: Pupils are equal, round, and reactive to light.   Cardiovascular:      Rate and Rhythm, soft systolic murmur left sternal border, no JVD no gallop.  Pulmonary:      Effort: Pulmonary effort is normal.      Breath sounds: Normal breath sounds.   Abdominal:      Palpations: Abdomen is soft.      Tenderness: There is no abdominal tenderness.   Musculoskeletal:      Cervical back: Normal range of motion and neck supple.      Right lower leg:  Mild pretibial edema     Left lower leg:  Mild pretibial edema  Skin:     General: Skin is warm and dry.   Neurological:      General: No focal deficit present.      Mental Status: He is alert.   Psychiatric:         Mood and Affect: Mood normal.         Behavior: Behavior normal.     EKG sinus rhythm incomplete right bundle-branch block   Chest x-ray no active lobar infiltrate   Troponins negative x2   Pro BNP level normal   Echo normal LV function, normal filling pressure, no significant valvular abnormalities    Assessment/Plan:       Chest pain-troponin negative x2  Normal LV function per ECHO  Normal filling pressure   Insufficient TR for RVSP  Hypertension   Diabetes mellitus  Hypothyroid   Obstructive sleep apnea   Psoriasis  Morbid obesity-  BMI 64    Post D/C follow up with Coronary CT   Weight loss  CPAP compliance            Justin Painter MD  Cardiology  [facility]

## 2025-03-19 NOTE — ED NOTES
"Denies CP/SOB currently "just tired " - resp even and unlabored - NSR  - no distress noted- amb to restroom without difficulty   " English

## 2025-03-19 NOTE — PLAN OF CARE
03/19/25 0841   Discharge Assessment   Assessment Type Discharge Planning Assessment   Confirmed/corrected address, phone number and insurance Yes   Confirmed Demographics Correct on Facesheet   Source of Information patient   When was your last doctors appointment?   (aprox 7 months ago)   Does patient/caregiver understand observation status Yes   Communicated DAJA with patient/caregiver Yes   Reason For Admission chest pain   People in Home child(anusha), dependent;spouse   Facility Arrived From: home   Do you expect to return to your current living situation? Yes   Do you have help at home or someone to help you manage your care at home? Yes   Who are your caregiver(s) and their phone number(s)? Vijaya  [phone]   Prior to hospitilization cognitive status: Alert/Oriented   Current cognitive status: Alert/Oriented   Walking or Climbing Stairs Difficulty no   Dressing/Bathing Difficulty no   Home Accessibility not wheelchair accessible   Equipment Currently Used at Home CPAP;glucometer   Readmission within 30 days? No   Patient currently being followed by outpatient case management? No   Do you currently have service(s) that help you manage your care at home? No   Do you have prescription coverage? Yes   Coverage [employer]   Do you have any problems affording any of your prescribed medications? No   Is the patient taking medications as prescribed? yes   Who is going to help you get home at discharge? wife   How do you get to doctors appointments? car, drives self   Are you on dialysis? No   Do you take coumadin? No   Discharge Plan A Home   Discharge Plan B Home   DME Needed Upon Discharge  none   Discharge Plan discussed with: Spouse/sig other;Patient   Transition of Care Barriers None   Physical Activity   On average, how many days per week do you engage in moderate to strenuous exercise (like a brisk walk)? 0 days   On average, how many minutes do you engage in exercise at this level? 0 min   Financial  Resource Strain   How hard is it for you to pay for the very basics like food, housing, medical care, and heating? Not very   Housing Stability   In the last 12 months, was there a time when you were not able to pay the mortgage or rent on time? N   At any time in the past 12 months, were you homeless or living in a shelter (including now)? N   Transportation Needs   Has the lack of transportation kept you from medical appointments, meetings, work or from getting things needed for daily living? No   Food Insecurity   Within the past 12 months, you worried that your food would run out before you got the money to buy more. Never true   Within the past 12 months, the food you bought just didn't last and you didn't have money to get more. Never true   Stress   Do you feel stress - tense, restless, nervous, or anxious, or unable to sleep at night because your mind is troubled all the time - these days? Not at all   Social Isolation   How often do you feel lonely or isolated from those around you?  Never   Alcohol Use   Q1: How often do you have a drink containing alcohol? Never   Q2: How many drinks containing alcohol do you have on a typical day when you are drinking? None   Q3: How often do you have six or more drinks on one occasion? Never   Utilities   In the past 12 months has the electric, gas, oil, or water company threatened to shut off services in your home? No   Health Literacy   How often do you need to have someone help you when you read instructions, pamphlets, or other written material from your doctor or pharmacy? Never   OTHER   Name(s) of People in Home wife and 2 y/o child

## 2025-03-19 NOTE — DISCHARGE SUMMARY
Hospital Medicine  Discharge Summary    Patient Name: Elijah Rivera  MRN: [id number]  Admit Date: 3/18/2025  Discharge Date:  3/19/2025  Status: OP- Observation   Length of Stay: 0      PHYSICIANS   Admitting Physician: Padma Rodarte MD  Discharging Physician: Jose Green MD.  Primary Care Physician: Diamond Cisse, FNP-C  Consults:       DISCHARGE DIAGNOSES   Chest pain       2. Hypertension; blood pressure fairly stable.  Continue metoprolol and add losartan.  Monitor blood pressure closely.     3. Type 2 diabetes mellitus; continue insulin sliding scale and monitor finger glucose serially.  Obtain hemoglobin A1c in the morning.     4. Hyperlipidemia; continue statin and low-cholesterol diet     5. Hypothyroidism; continue Synthroid.  Obtain TSH level     6. Obstructive sleep apnea; continue nightly CPAP     7. Probable underlying chronic diastolic heart failure; continue Lasix.       8. Morbid obesity; low-calorie diet has been advised.       PROCEDURES   * No surgery found *         HOSPITAL COURSE     50 y.o. morbidly obese male with a medical history of hypertension, type 2 diabetes mellitus, hyperlipidemia, hypothyroidism, obstructive sleep apnea on nightly CPAP presents to the ER on account of left-sided chest pain earlier today.    Patient has been at his usual state of health until earlier today while he was driving suddenly developed left-sided chest pain, sharp, about 8/10 in intensity, nonradiating lasted for 20-1/2 minutes.  No associated shortness of breadth.  No nausea no vomiting or diarrhea no constipation.  No fever.  He decided to come to the ER for further evaluation.  He had a similar chest pain about 2 months ago.  At the ER, cardiac enzymes not remarkable, EKG shows normal sinus rhythm with incomplete right bundle jen block.  Chest pain had resolved at this time.  Patient was evaluated by the Cardiology team and recommended for 2D echocardiogram and to observe over the next 24  hours.  Troponin negative x 2  Chest pain resolved  Cardio recommend outpatient follow up       STATUS  ImprovedStable    DISPOSITION  Discharge to home     DIET      ACTIVITY  As tolerated      FOLLOW-UP      Follow-up Information       Justin Painter MD Follow up on 3/20/2025.    Specialty: Cardiology  Why: PLEASE ATTEND FOLLOW UP TOMORROW AT 10 AM.  Contact information:  [address] [place] [id number]  [phone]                               DISCHARGE MEDICATION RECONCILIATION        Medication List        START taking these medications      losartan 25 MG tablet  Commonly known as: COZAAR  Take 1 tablet (25 mg total) by mouth once daily.  Start taking on: March 20, 2025     metoprolol tartrate 25 MG tablet  Commonly known as: LOPRESSOR  Take 1 tablet (25 mg total) by mouth 2 (two) times daily.     nitroGLYCERIN 0.4 MG SL tablet  Commonly known as: NITROSTAT  Place 1 tablet (0.4 mg total) under the tongue every 5 (five) minutes as needed for Chest pain.            CHANGE how you take these medications      * FLUoxetine 20 MG capsule  What changed: Another medication with the same name was changed. Make sure you understand how and when to take each.     * FLUoxetine 20 MG capsule  Take 3 capsules (60 mg total) by mouth once daily.  What changed: how much to take     prazosin 1 MG Cap  Commonly known as: MINIPRESS  Take 3 capsules (3 mg total) by mouth every evening.  What changed: how much to take           * This list has 2 medication(s) that are the same as other medications prescribed for you. Read the directions carefully, and ask your doctor or other care provider to review them with you.                CONTINUE taking these medications      albuterol 90 mcg/actuation inhaler  Commonly known as: PROVENTIL/VENTOLIN HFA  inhale 2 puffs by mouth every 4 to 6 hours as needed     blood sugar diagnostic Strp  1 strip by Misc.(Non-Drug; Combo Route) route 2 (two) times a day.     dapagliflozin  propanediol 5 mg Tab tablet  Commonly known as: Farxiga     furosemide 40 MG tablet  Commonly known as: LASIX  Take 1 tablet (40 mg total) by mouth once daily.     * lancets 33 gauge Misc  Commonly known as: ONETOUCH DELICA LANCETS  1 lancet by Misc.(Non-Drug; Combo Route) route 2 (two) times a day.     * ONETOUCH DELICA PLUS LANCET 30 gauge Misc  Generic drug: lancets     levothyroxine 150 MCG tablet  Commonly known as: SYNTHROID  Take 1 tablet (150 mcg total) by mouth before breakfast.     metFORMIN 1,000 mg 24hr tablet  Commonly known as: FORTAMET     montelukast 10 mg tablet  Commonly known as: SINGULAIR  Take 1 tablet (10 mg total) by mouth every evening.     pantoprazole 40 MG tablet  Commonly known as: PROTONIX  Take 1 tablet (40 mg total) by mouth 2 (two) times daily.     rosuvastatin 10 MG tablet  Commonly known as: CRESTOR  take 1 tablet by mouth every evening           * This list has 2 medication(s) that are the same as other medications prescribed for you. Read the directions carefully, and ask your doctor or other care provider to review them with you.                   Where to Get Your Medications        [facility] [address]      Phone: [phone]   nitroGLYCERIN 0.4 MG SL tablet       Information about where to get these medications is not yet available    Ask your nurse or doctor about these medications  losartan 25 MG tablet  metoprolol tartrate 25 MG tablet           PHYSICAL EXAM   VITALS: T 98.3 °F (36.8 °C)   /85   P 74   RR 18   O2 96 %      General Appearance:    Awake, alert, not in acute distress   HEENT:   atraumatic, PERRL, EOM intact, conjuctiva pink, sclera anicteric, oropharynx moist, no lesions noted   Neck:    Supple, no JVD, no carotid bruits, no lymphadenopathy or thyromegaly noted         Pulmonary:   Clear to auscultation bilaterally, reduced breath sounds  "bileterally.   Cardiovascular:    Regular rate and rhythm, S1 S2 normal   Abdomen:     Soft, non-tender,nondistended, bowel sounds active all four quadrants, no masses, no organomegaly   Extremities:  no edema, no cyanosis or clubbing noted         Skin:   No bruises noted.               DIAGNOSITCS   CBC:   Recent Labs   Lab 03/18/25  1548   WBC 7.42   HGB 14.0   HCT 44.0          CMP:   Recent Labs   Lab 03/18/25  1548 03/19/25  0230   CALCIUM 9.3 9.2   ALBUMIN 4.3  --     136   K 3.9 3.8   CO2 30 31    102   BUN 19 17   CREATININE 0.89 0.82   ALKPHOS 111  --    ALT 42  --    AST 31  --    BILITOT 0.4  --      Estimated Creatinine Clearance: 155.3 mL/min (based on SCr of 0.82 mg/dL).    Labs within the past 24 hours have been reviewed.     COAG:  No results for input(s): "APTT", "INR", "PTT" in the last 168 hours.    CARDIAC ENZYMES:   Recent Labs     03/18/25  1825 03/19/25  0230 03/19/25  1019   TROPONINI <0.012 <0.012 <0.012        No results for input(s): "AMYLASE", "LIPASE" in the last 168 hours.    Recent Labs     03/18/25  2239 03/19/25  0719 03/19/25  1102   POCTGLUCOSE 157* 115* 154*        Microbiology Results (last 7 days)       ** No results found for the last 168 hours. **             Recent Labs     03/19/25  0230   CHOL 138   TRIG 159   HDL 45      Lab Results   Component Value Date    HGBA1C 6.9 (H) 03/19/2025        X-Ray Chest AP Portable  Result Date: 3/18/2025  EXAMINATION: XR CHEST AP PORTABLE CLINICAL HISTORY: Chest Pain; TECHNIQUE: Single frontal view of the chest was performed. COMPARISON: 10/01/2024 FINDINGS: There is magnification of the cardiac silhouette and the perihilar bronchovesicular markings, making it difficult to exclude the possibility of a mild bronchial inflammatory process. An epicardial fat pad is present at the left cardiac margin. The lungs are otherwise clear, with no pleural effusion or pneumothorax. Bones are intact.     Magnification as described " above. Electronically signed by: Gustavo Lanza Date:    03/18/2025 Time:    16:25      N/A     Patient Screened for food insecurity, housing instability, transportation needs, utility difficulties, and interpersonal safety.  No needs identified    Discharge time: 33 minutes         Jose Green MD  [facility]

## 2025-03-19 NOTE — H&P
Chief Complaint; LEFT-SIDED CHEST PAIN EARLIER TODAY    HPI:   Patient is a 50 y.o. morbidly obese male with a medical history of hypertension, type 2 diabetes mellitus, hyperlipidemia, hypothyroidism, obstructive sleep apnea on nightly CPAP presents to the ER on account of left-sided chest pain earlier today.    Patient has been at his usual state of health until earlier today while he was driving suddenly developed left-sided chest pain, sharp, about 8/10 in intensity, nonradiating lasted for 20-1/2 minutes.  No associated shortness of breadth.  No nausea no vomiting or diarrhea no constipation.  No fever.  He decided to come to the ER for further evaluation.  He had a similar chest pain about 2 months ago.  At the ER, cardiac enzymes not remarkable, EKG shows normal sinus rhythm with incomplete right bundle jen block.  Chest pain had resolved at this time.  Patient was evaluated by the Cardiology team and recommended for 2D echocardiogram and to observe over the next 24 hours.    PCP Diamond Cisse, SAHARA TAYLOR        Past Medical History:   Diagnosis Date    Benign hypertension 2016    Hyperlipidemia     Severe obstructive sleep apnea 2021    Dx per Sleep Study done on 2021    Thyroid disease     Type 2 diabetes mellitus 2016        Past Surgical History:   Procedure Laterality Date    ROTATOR CUFF REPAIR          Prescriptions Prior to Admission[1]    Review of patient's allergies indicates:   Allergen Reactions    Fig Anaphylaxis    Kiwi (actinidia chinensis) Anaphylaxis    Venom-wasp         Social History     Tobacco Use    Smoking status: Former     Current packs/day: 0.00     Types: Cigarettes     Quit date: 10/11/1999     Years since quittin.4    Smokeless tobacco: Never   Substance Use Topics    Alcohol use: Not Currently        Family History   Problem Relation Name Age of Onset    Asthma Mother      COPD Mother      Heart disease Father      Hyperlipidemia Father      Heart  "attack Father      Lung cancer Maternal Uncle      Heart disease Paternal Aunt      Heart disease Paternal Uncle         Review of Systems  Review of Systems   Constitutional: Negative for fever.   HEENT: Negative for drooling, ear pain, facial swelling and nosebleeds.    Eyes: Negative for discharge and visual disturbance.   Respiratory; positive for left-sided chest pain.  No shortness a of breadth  Cardiovascular: negative for chest pain or SOB  Gastrointestinal: Negative for anal bleeding and rectal pain. Negative Nausea or Vomiting.  Genitourinary: Negative for decreased urine volume and dysuria  Musculoskeletal: Negative for neck pain.   Skin: Negative for rash.   Neurological: negative for numbness, negative for weakness,negative for seizures and facial asymmetry.              Objective:     No intake/output data recorded.    /83 (BP Location: Right arm, Patient Position: Sitting)   Pulse 92   Temp 98.1 °F (36.7 °C) (Oral)   Resp 20   Ht 5' 4" (1.626 m)   Wt (!) 168.1 kg (370 lb 8 oz)   SpO2 (!) 94%   BMI 63.60 kg/m²     General Appearance:    Awake, alert, not in acute distress   HEENT:   atraumatic, PERRL, EOM intact, conjuctiva pink, sclera anicteric, oropharynx moist, no lesions noted   Neck:    Supple, no JVD, no carotid bruits, no lymphadenopathy or thyromegaly noted       Pulmonary:   Clear to auscultation bilaterally, reduced breath sounds bileterally.   Cardiovascular:    Regular rate and rhythm, S1 S2 normal   Abdomen:     Soft, non-tender,nondistended, bowel sounds active all four quadrants, no masses, no organomegaly   Extremities:  no edema, no cyanosis or clubbing noted       Skin:   No bruises noted.       Neurologic: Alert, awake, oriented x 3, moves all extremities.                 Data Review :   Labs:    CBC:   Lab Results   Component Value Date    WBC 7.42 03/18/2025    WBC 6.0 12/09/2024    RBC 5.62 03/18/2025    RBC 5.60 12/09/2024    HGB 14.0 03/18/2025    HGB 14.5 " "12/09/2024    HCT 44.0 03/18/2025    HCT 46.0 12/09/2024     03/18/2025     12/09/2024     CMP:   Lab Results   Component Value Date     03/18/2025     12/09/2024    K 3.9 03/18/2025    K 4.1 12/09/2024     03/18/2025     12/09/2024    CO2 30 03/18/2025    CO2 31 12/09/2024    BUN 19 03/18/2025    BUN 20 12/09/2024    CREATININE 0.89 03/18/2025    CREATININE 0.87 12/09/2024    CALCIUM 9.3 03/18/2025    CALCIUM 9.1 12/09/2024    ALKPHOS 111 03/18/2025    ALKPHOS 80 07/17/2020    AST 31 03/18/2025    AST 18 12/09/2024    ALT 42 03/18/2025    ALT 26 12/09/2024    ALBUMIN 4.3 03/18/2025    ALBUMIN 4.0 12/09/2024    BILITOT 0.4 03/18/2025    BILITOT 0.6 12/09/2024     Cardiac markers: No results found for: "BNP", "CKMB", "CKTOTAL", "TROPONIN", "MYOGLOBIN"    Radiology:  Micro:  No components found for: "BLOODCX", "SPUTUMCX", "URINECX"    Radiology:  @[facility]@        Assessment & Plan:   1. Atypical chest pain; acute coronary syndrome being ruled out, facet of troponin negative, EKG shows normal sinus rhythm with incomplete right bundle branch block with no obvious acute ST wave changes, repeat troponin.  Place patient on telemetry.  Place on aspirin, statin and beta-blocker.  2D echocardiogram has been ordered.  .  Obtain D-dimer.  Patient may require stress test.  We will defer further management to the cardiologist.    2. Hypertension; blood pressure fairly stable.  Continue metoprolol and add losartan.  Monitor blood pressure closely.    3. Type 2 diabetes mellitus; continue insulin sliding scale and monitor finger glucose serially.  Obtain hemoglobin A1c in the morning.    4. Hyperlipidemia; continue statin and low-cholesterol diet    5. Hypothyroidism; continue Synthroid.  Obtain TSH level    6. Obstructive sleep apnea; continue nightly CPAP    7. Probable underlying chronic diastolic heart failure; continue Lasix.      8. Morbid obesity; low-calorie diet has been advised. "     9. Maintain the patient on DVT prophylaxis by way of Lovenox     Disposition; place on observation status.  Follow up with further recommendation by the cardiologist.  Follow up 2D echocardiogram.    This note was completed using voice recognition software and transcription errors may occur.    This Encounter was via Telemedicine (Both audio and visual )     My Location; [place], [place]    Patient was located,[place], [place].    Evaluation  of the patient was done alongside the patient's nursing staff       Harleen Benito  3/18/2025  8:22 PM         [1] (Not in a hospital admission)

## 2025-03-19 NOTE — SUBJECTIVE & OBJECTIVE
Teleconsult Time Documentation      Subjective:     Patient information was obtained from patient , medical record.    Past Medical History:   Past Medical History:   Diagnosis Date    Anxiety disorder, unspecified     Benign hypertension 2016    Depression     Hyperlipidemia     Psoriasis     Severe obstructive sleep apnea 2021    Dx per Sleep Study done on 2021    Thyroid disease     Type 2 diabetes mellitus 2016       Past Surgical History:   Past Surgical History:   Procedure Laterality Date    ROTATOR CUFF REPAIR         Family History:   Family History   Problem Relation Name Age of Onset    Asthma Mother      COPD Mother      Heart disease Father      Hyperlipidemia Father      Heart attack Father      Lung cancer Maternal Uncle      Heart disease Paternal Aunt      Heart disease Paternal Uncle          Social History:   Social History     Tobacco Use    Smoking status: Former     Current packs/day: 0.00     Types: Cigarettes     Quit date: 10/11/1999     Years since quittin.4    Smokeless tobacco: Never   Substance Use Topics    Alcohol use: Not Currently        Medications: Current Medications[1]    Allergies:   Review of patient's allergies indicates:   Allergen Reactions    Fig Anaphylaxis    Kiwi (actinidia chinensis) Anaphylaxis    Venom-wasp        Review of Systems   Constitutional: Negative.   HENT: Negative.     Eyes: Negative.    Cardiovascular:  Positive for chest pain. Negative for dyspnea on exertion, orthopnea, palpitations, paroxysmal nocturnal dyspnea and syncope.   Respiratory:  Negative for cough and shortness of breath.    Endocrine: Negative.    Hematologic/Lymphatic: Negative.    Skin:  Positive for dry skin.   Musculoskeletal:  Positive for arthritis.   Gastrointestinal: Negative.    Genitourinary: Negative.    Neurological: Negative.    Psychiatric/Behavioral:  The patient has insomnia.    Allergic/Immunologic: Negative.      Objective:     Vitals:   Vitals:     03/19/25 1100   BP: 132/85   Pulse: 74   Resp: 18   Temp: 98.3 °F (36.8 °C)        Physical Exam  Constitutional:       General: He is not in acute distress.  HENT:      Head: Normocephalic and atraumatic.      Nose: No congestion.      Mouth/Throat:      Mouth: Mucous membranes are moist.   Eyes:      Extraocular Movements: Extraocular movements intact.      Pupils: Pupils are equal, round, and reactive to light.   Cardiovascular:      Rate and Rhythm: Normal rate.   Pulmonary:      Effort: Pulmonary effort is normal.      Breath sounds: Normal breath sounds.   Abdominal:      Palpations: Abdomen is soft.      Tenderness: There is no abdominal tenderness.   Musculoskeletal:      Cervical back: Normal range of motion and neck supple.      Right lower leg: No edema.      Left lower leg: No edema.   Skin:     General: Skin is warm and dry.   Neurological:      General: No focal deficit present.      Mental Status: He is alert.   Psychiatric:         Mood and Affect: Mood normal.         Behavior: Behavior normal.            [1]   Current Facility-Administered Medications:     acetaminophen tablet 650 mg, 650 mg, Oral, Q4H PRN, Jose Rooney MD    albuterol nebulizer solution 2.5 mg, 2.5 mg, Nebulization, Q4H PRN, Padma Rodarte MD    aspirin chewable tablet 81 mg, 81 mg, Oral, Daily, Harleen Benito MD, 81 mg at 03/19/25 0906    atorvastatin tablet 40 mg, 40 mg, Oral, Daily, Jose Rooney MD, 40 mg at 03/19/25 0906    dextrose 50% injection 12.5 g, 12.5 g, Intravenous, PRN, Harleen Benito MD    dextrose 50% injection 25 g, 25 g, Intravenous, PRN, Harleen Benito MD    doxazosin tablet 1 mg, 1 mg, Oral, QHS, Jose Rooney MD    enoxaparin injection 40 mg, 40 mg, Subcutaneous, Q12H (prophylaxis, 0900/2100), Harleen Benito MD, 40 mg at 03/19/25 0906    FLUoxetine capsule 20 mg, 20 mg, Oral, Daily, Harleen Benito MD, 20 mg at 03/19/25 0906    FLUoxetine capsule 40 mg, 40 mg, Oral,  QHIGINIO, Harleen Benito MD, 40 mg at 03/18/25 2235    fluticasone furoate-vilanteroL 200-25 mcg/dose diskus inhaler 1 puff, 1 puff, Inhalation, Daily, Jose Rooney MD, 1 puff at 03/19/25 0725    furosemide tablet 40 mg, 40 mg, Oral, Daily, Jose Rooney MD, 40 mg at 03/19/25 0906    glucagon (human recombinant) injection 1 mg, 1 mg, Intramuscular, PRN, Harleen Benito MD    glucose chewable tablet 16 g, 16 g, Oral, PRN, Harleen Benito MD    glucose chewable tablet 24 g, 24 g, Oral, PRN, Harleen Benito MD    hydrALAZINE injection 10 mg, 10 mg, Intravenous, Q6H PRN, Harleen Benito MD    hydrOXYzine HCL tablet 25 mg, 25 mg, Oral, Daily PRN, Jose Rooney MD    insulin aspart U-100 pen 0-5 Units, 0-5 Units, Subcutaneous, QID (AC + HS) PRN, Padma Rodarte MD    levothyroxine tablet 150 mcg, 150 mcg, Oral, Before breakfast, Jose Rooney MD, 150 mcg at 03/19/25 0650    losartan tablet 25 mg, 25 mg, Oral, Daily, Harleen Benito MD, 25 mg at 03/19/25 0906    metoprolol tartrate (LOPRESSOR) tablet 25 mg, 25 mg, Oral, BID, Harleen Benito MD, 25 mg at 03/19/25 0906    montelukast tablet 10 mg, 10 mg, Oral, QHS, Jose Rooney MD, 10 mg at 03/18/25 2236    ondansetron injection 4 mg, 4 mg, Intravenous, Q8H PRN, Jose Rooney MD    oxyCODONE immediate release tablet 5 mg, 5 mg, Oral, Q4H PRN, Jose Rooney MD    pantoprazole EC tablet 40 mg, 40 mg, Oral, BID, Jose Rooney MD, 40 mg at 03/19/25 0906    perflutren lipid microspheres injection 2 mL, 2 mL, Intravenous, Once, Padma Rodarte MD    primidone tablet 50 mg, 50 mg, Oral, Daily, Jose Rooney MD, 50 mg at 03/19/25 0906    prochlorperazine injection Soln 5 mg, 5 mg, Intravenous, Q6H PRN, Jose Rooney MD    sodium chloride 0.9% flush 10 mL, 10 mL, Intravenous, PRN, Jose Rooney MD    Current Outpatient Medications:     albuterol (PROVENTIL/VENTOLIN HFA) 90 mcg/actuation inhaler, inhale 2  puffs by mouth every 4 to 6 hours as needed, Disp: 8.5 g, Rfl: 11    dapagliflozin propanediol (FARXIGA) 5 mg Tab tablet, Take 5 mg by mouth once daily., Disp: , Rfl:     FLUoxetine 20 MG capsule, Take 40 mg by mouth every evening., Disp: , Rfl:     furosemide (LASIX) 40 MG tablet, Take 1 tablet (40 mg total) by mouth once daily., Disp: 30 tablet, Rfl: 2    levothyroxine (SYNTHROID) 150 MCG tablet, Take 1 tablet (150 mcg total) by mouth before breakfast., Disp: 30 tablet, Rfl: 1    metFORMIN (FORTAMET) 1,000 mg 24hr tablet, Take 1,000 mg by mouth daily with breakfast., Disp: , Rfl:     montelukast (SINGULAIR) 10 mg tablet, Take 1 tablet (10 mg total) by mouth every evening., Disp: 30 tablet, Rfl: 11    pantoprazole (PROTONIX) 40 MG tablet, Take 1 tablet (40 mg total) by mouth 2 (two) times daily., Disp: 60 tablet, Rfl: 5    prazosin (MINIPRESS) 1 MG Cap, Take 3 capsules (3 mg total) by mouth every evening. (Patient taking differently: Take 1 mg by mouth every evening.), Disp: 90 capsule, Rfl: 1    rosuvastatin (CRESTOR) 10 MG tablet, take 1 tablet by mouth every evening, Disp: 90 tablet, Rfl: 3    blood sugar diagnostic Strp, 1 strip by Misc.(Non-Drug; Combo Route) route 2 (two) times a day., Disp: 100 strip, Rfl: 2    lancets (ONETOUCH DELICA LANCETS) 33 gauge Misc, 1 lancet by Misc.(Non-Drug; Combo Route) route 2 (two) times a day., Disp: 100 each, Rfl: 2    [START ON 3/20/2025] losartan (COZAAR) 25 MG tablet, Take 1 tablet (25 mg total) by mouth once daily., Disp: , Rfl:     metoprolol tartrate (LOPRESSOR) 25 MG tablet, Take 1 tablet (25 mg total) by mouth 2 (two) times daily., Disp: , Rfl:     nitroGLYCERIN (NITROSTAT) 0.4 MG SL tablet, Place 1 tablet (0.4 mg total) under the tongue every 5 (five) minutes as needed for Chest pain., Disp: 30 tablet, Rfl: 0    ONETOUCH DELICA PLUS LANCET 30 gauge Misc, 2 (two) times a day., Disp: , Rfl:

## 2025-03-19 NOTE — PLAN OF CARE
Problem: Adult Inpatient Plan of Care  Goal: Plan of Care Review  3/19/2025 1317 by Bruna Lora LPN  Outcome: Met  3/19/2025 1016 by Bruna Lora LPN  Outcome: Progressing  Goal: Patient-Specific Goal (Individualized)  3/19/2025 1317 by Bruna Lora LPN  Outcome: Met  3/19/2025 1016 by Bruna Lora LPN  Outcome: Progressing  Goal: Absence of Hospital-Acquired Illness or Injury  3/19/2025 1317 by Bruna Lora LPN  Outcome: Met  3/19/2025 1016 by Bruna Lora LPN  Outcome: Progressing  Goal: Optimal Comfort and Wellbeing  3/19/2025 1317 by Bruna Lora LPN  Outcome: Met  3/19/2025 1016 by Bruna Lora LPN  Outcome: Progressing  Goal: Readiness for Transition of Care  3/19/2025 1317 by Bruna Lora LPN  Outcome: Met  3/19/2025 1016 by Bruna Lora LPN  Outcome: Progressing     Problem: Bariatric Environmental Safety  Goal: Safety Maintained with Care  3/19/2025 1317 by Bruna Lora LPN  Outcome: Met  3/19/2025 1016 by Bruna Lora LPN  Outcome: Progressing     Problem: Diabetes Comorbidity  Goal: Blood Glucose Level Within Targeted Range  3/19/2025 1317 by Bruna Lora LPN  Outcome: Met  3/19/2025 1016 by Bruna Lora LPN  Outcome: Progressing     Problem: Chest Pain  Goal: Resolution of Chest Pain Symptoms  3/19/2025 1317 by Bruna Lora LPN  Outcome: Met  3/19/2025 1016 by Bruna Lora LPN  Outcome: Progressing

## 2025-03-19 NOTE — PLAN OF CARE
Problem: Adult Inpatient Plan of Care  Goal: Plan of Care Review  Outcome: Progressing  Goal: Patient-Specific Goal (Individualized)  Outcome: Progressing  Goal: Absence of Hospital-Acquired Illness or Injury  Outcome: Progressing  Goal: Optimal Comfort and Wellbeing  Outcome: Progressing  Goal: Readiness for Transition of Care  Outcome: Progressing     Problem: Bariatric Environmental Safety  Goal: Safety Maintained with Care  Outcome: Progressing     Problem: Diabetes Comorbidity  Goal: Blood Glucose Level Within Targeted Range  Outcome: Progressing     Problem: Chest Pain  Goal: Resolution of Chest Pain Symptoms  Outcome: Progressing

## 2025-04-02 ENCOUNTER — HOSPITAL ENCOUNTER (OUTPATIENT)
Dept: RADIOLOGY | Facility: HOSPITAL | Age: 50
Discharge: HOME | End: 2025-04-02
Attending: INTERNAL MEDICINE
Payer: COMMERCIAL

## 2025-04-02 DIAGNOSIS — R07.9 CHEST PAIN: ICD-10-CM

## 2025-04-02 PROCEDURE — 75571 CT HRT W/O DYE W/CA TEST: CPT | Mod: TC

## 2025-06-19 ENCOUNTER — HOSPITAL ENCOUNTER (OUTPATIENT)
Dept: RADIOLOGY | Facility: HOSPITAL | Age: 50
Discharge: HOME | End: 2025-06-19
Attending: INTERNAL MEDICINE
Payer: COMMERCIAL

## 2025-06-19 DIAGNOSIS — E04.2 MULTINODULAR GOITER: ICD-10-CM

## 2025-06-19 PROCEDURE — 70492 CT SFT TSUE NCK W/O & W/DYE: CPT | Mod: TC

## 2025-06-19 PROCEDURE — 25500020 PHARM REV CODE 255: Performed by: INTERNAL MEDICINE

## 2025-06-19 RX ADMIN — IOHEXOL 100 ML: 300 INJECTION, SOLUTION INTRAVENOUS at 08:06
